# Patient Record
Sex: FEMALE | Race: WHITE | NOT HISPANIC OR LATINO | URBAN - METROPOLITAN AREA
[De-identification: names, ages, dates, MRNs, and addresses within clinical notes are randomized per-mention and may not be internally consistent; named-entity substitution may affect disease eponyms.]

---

## 2012-09-06 RX ORDER — LOSARTAN POTASSIUM 100 MG/1
1 TABLET, FILM COATED ORAL
Qty: 0 | Refills: 0 | COMMUNITY
Start: 2012-09-06

## 2017-01-18 ENCOUNTER — OUTPATIENT (OUTPATIENT)
Dept: OUTPATIENT SERVICES | Facility: HOSPITAL | Age: 82
LOS: 1 days | End: 2017-01-18
Payer: MEDICARE

## 2017-01-18 ENCOUNTER — APPOINTMENT (OUTPATIENT)
Dept: CT IMAGING | Facility: IMAGING CENTER | Age: 82
End: 2017-01-18

## 2017-01-18 DIAGNOSIS — Z00.8 ENCOUNTER FOR OTHER GENERAL EXAMINATION: ICD-10-CM

## 2017-01-18 DIAGNOSIS — S72.91XA UNSPECIFIED FRACTURE OF RIGHT FEMUR, INITIAL ENCOUNTER FOR CLOSED FRACTURE: Chronic | ICD-10-CM

## 2017-01-18 DIAGNOSIS — Z98.89 OTHER SPECIFIED POSTPROCEDURAL STATES: Chronic | ICD-10-CM

## 2017-01-18 PROCEDURE — 71250 CT THORAX DX C-: CPT

## 2017-10-05 ENCOUNTER — APPOINTMENT (OUTPATIENT)
Dept: OBGYN | Facility: CLINIC | Age: 82
End: 2017-10-05
Payer: MEDICARE

## 2017-10-05 PROCEDURE — 99213 OFFICE O/P EST LOW 20 MIN: CPT

## 2017-10-21 ENCOUNTER — OUTPATIENT (OUTPATIENT)
Dept: OUTPATIENT SERVICES | Facility: HOSPITAL | Age: 82
LOS: 1 days | End: 2017-10-21
Payer: MEDICARE

## 2017-10-21 ENCOUNTER — APPOINTMENT (OUTPATIENT)
Dept: ULTRASOUND IMAGING | Facility: IMAGING CENTER | Age: 82
End: 2017-10-21
Payer: MEDICARE

## 2017-10-21 DIAGNOSIS — Z00.8 ENCOUNTER FOR OTHER GENERAL EXAMINATION: ICD-10-CM

## 2017-10-21 DIAGNOSIS — Z98.89 OTHER SPECIFIED POSTPROCEDURAL STATES: Chronic | ICD-10-CM

## 2017-10-21 DIAGNOSIS — S72.91XA UNSPECIFIED FRACTURE OF RIGHT FEMUR, INITIAL ENCOUNTER FOR CLOSED FRACTURE: Chronic | ICD-10-CM

## 2017-10-21 PROCEDURE — 76856 US EXAM PELVIC COMPLETE: CPT

## 2017-10-21 PROCEDURE — 76856 US EXAM PELVIC COMPLETE: CPT | Mod: 26

## 2017-10-21 PROCEDURE — 76830 TRANSVAGINAL US NON-OB: CPT

## 2017-10-21 PROCEDURE — 76830 TRANSVAGINAL US NON-OB: CPT | Mod: 26

## 2017-12-07 ENCOUNTER — INPATIENT (INPATIENT)
Facility: HOSPITAL | Age: 82
LOS: 1 days | Discharge: HOME | DRG: 259 | End: 2017-12-09
Attending: INTERNAL MEDICINE | Admitting: INTERNAL MEDICINE
Payer: MEDICARE

## 2017-12-07 VITALS
OXYGEN SATURATION: 97 % | HEART RATE: 67 BPM | DIASTOLIC BLOOD PRESSURE: 83 MMHG | RESPIRATION RATE: 18 BRPM | TEMPERATURE: 98 F | SYSTOLIC BLOOD PRESSURE: 115 MMHG

## 2017-12-07 DIAGNOSIS — S72.91XA UNSPECIFIED FRACTURE OF RIGHT FEMUR, INITIAL ENCOUNTER FOR CLOSED FRACTURE: Chronic | ICD-10-CM

## 2017-12-07 DIAGNOSIS — R06.02 SHORTNESS OF BREATH: ICD-10-CM

## 2017-12-07 DIAGNOSIS — Z98.89 OTHER SPECIFIED POSTPROCEDURAL STATES: Chronic | ICD-10-CM

## 2017-12-07 LAB
ALBUMIN SERPL ELPH-MCNC: 3.5 G/DL — SIGNIFICANT CHANGE UP (ref 3.3–5)
ALP SERPL-CCNC: 137 U/L — HIGH (ref 40–120)
ALT FLD-CCNC: 14 U/L RC — SIGNIFICANT CHANGE UP (ref 10–45)
ANION GAP SERPL CALC-SCNC: 11 MMOL/L — SIGNIFICANT CHANGE UP (ref 5–17)
APPEARANCE UR: ABNORMAL
APTT BLD: 26.9 SEC — LOW (ref 27.5–37.4)
AST SERPL-CCNC: 27 U/L — SIGNIFICANT CHANGE UP (ref 10–40)
BACTERIA # UR AUTO: ABNORMAL /HPF
BASOPHILS # BLD AUTO: 0 K/UL — SIGNIFICANT CHANGE UP (ref 0–0.2)
BASOPHILS NFR BLD AUTO: 0.6 % — SIGNIFICANT CHANGE UP (ref 0–2)
BILIRUB SERPL-MCNC: 0.4 MG/DL — SIGNIFICANT CHANGE UP (ref 0.2–1.2)
BILIRUB UR-MCNC: NEGATIVE — SIGNIFICANT CHANGE UP
BUN SERPL-MCNC: 23 MG/DL — SIGNIFICANT CHANGE UP (ref 7–23)
CALCIUM SERPL-MCNC: 8.9 MG/DL — SIGNIFICANT CHANGE UP (ref 8.4–10.5)
CHLORIDE SERPL-SCNC: 101 MMOL/L — SIGNIFICANT CHANGE UP (ref 96–108)
CK MB BLD-MCNC: 1.3 % — SIGNIFICANT CHANGE UP (ref 0–3.5)
CK MB CFR SERPL CALC: 2 NG/ML — SIGNIFICANT CHANGE UP (ref 0–3.8)
CK SERPL-CCNC: 152 U/L — SIGNIFICANT CHANGE UP (ref 25–170)
CO2 SERPL-SCNC: 25 MMOL/L — SIGNIFICANT CHANGE UP (ref 22–31)
COLOR SPEC: YELLOW — SIGNIFICANT CHANGE UP
COMMENT - URINE: SIGNIFICANT CHANGE UP
COMMENT - URINE: SIGNIFICANT CHANGE UP
CREAT SERPL-MCNC: 0.79 MG/DL — SIGNIFICANT CHANGE UP (ref 0.5–1.3)
DIFF PNL FLD: NEGATIVE — SIGNIFICANT CHANGE UP
EOSINOPHIL # BLD AUTO: 0 K/UL — SIGNIFICANT CHANGE UP (ref 0–0.5)
EOSINOPHIL NFR BLD AUTO: 0.7 % — SIGNIFICANT CHANGE UP (ref 0–6)
EPI CELLS # UR: SIGNIFICANT CHANGE UP /HPF
GLUCOSE SERPL-MCNC: 93 MG/DL — SIGNIFICANT CHANGE UP (ref 70–99)
GLUCOSE UR QL: NEGATIVE — SIGNIFICANT CHANGE UP
HCT VFR BLD CALC: 34.9 % — SIGNIFICANT CHANGE UP (ref 34.5–45)
HGB BLD-MCNC: 11.7 G/DL — SIGNIFICANT CHANGE UP (ref 11.5–15.5)
INR BLD: 1.3 RATIO — HIGH (ref 0.88–1.16)
KETONES UR-MCNC: NEGATIVE — SIGNIFICANT CHANGE UP
LEUKOCYTE ESTERASE UR-ACNC: ABNORMAL
LYMPHOCYTES # BLD AUTO: 1.5 K/UL — SIGNIFICANT CHANGE UP (ref 1–3.3)
LYMPHOCYTES # BLD AUTO: 22.7 % — SIGNIFICANT CHANGE UP (ref 13–44)
MCHC RBC-ENTMCNC: 33.5 GM/DL — SIGNIFICANT CHANGE UP (ref 32–36)
MCHC RBC-ENTMCNC: 35.1 PG — HIGH (ref 27–34)
MCV RBC AUTO: 105 FL — HIGH (ref 80–100)
MONOCYTES # BLD AUTO: 0.6 K/UL — SIGNIFICANT CHANGE UP (ref 0–0.9)
MONOCYTES NFR BLD AUTO: 9.7 % — SIGNIFICANT CHANGE UP (ref 2–14)
NEUTROPHILS # BLD AUTO: 4.4 K/UL — SIGNIFICANT CHANGE UP (ref 1.8–7.4)
NEUTROPHILS NFR BLD AUTO: 66.2 % — SIGNIFICANT CHANGE UP (ref 43–77)
NITRITE UR-MCNC: POSITIVE
PH UR: 7.5 — SIGNIFICANT CHANGE UP (ref 5–8)
PLATELET # BLD AUTO: 246 K/UL — SIGNIFICANT CHANGE UP (ref 150–400)
POTASSIUM SERPL-MCNC: 4.4 MMOL/L — SIGNIFICANT CHANGE UP (ref 3.5–5.3)
POTASSIUM SERPL-SCNC: 4.4 MMOL/L — SIGNIFICANT CHANGE UP (ref 3.5–5.3)
PROT SERPL-MCNC: 7.3 G/DL — SIGNIFICANT CHANGE UP (ref 6–8.3)
PROT UR-MCNC: 30 MG/DL
PROTHROM AB SERPL-ACNC: 14.1 SEC — HIGH (ref 9.8–12.7)
RBC # BLD: 3.33 M/UL — LOW (ref 3.8–5.2)
RBC # FLD: 14.9 % — HIGH (ref 10.3–14.5)
RBC CASTS # UR COMP ASSIST: SIGNIFICANT CHANGE UP /HPF (ref 0–2)
SODIUM SERPL-SCNC: 137 MMOL/L — SIGNIFICANT CHANGE UP (ref 135–145)
SP GR SPEC: 1.02 — SIGNIFICANT CHANGE UP (ref 1.01–1.02)
TROPONIN T SERPL-MCNC: <0.01 NG/ML — SIGNIFICANT CHANGE UP (ref 0–0.06)
UROBILINOGEN FLD QL: NEGATIVE — SIGNIFICANT CHANGE UP
WBC # BLD: 6.6 K/UL — SIGNIFICANT CHANGE UP (ref 3.8–10.5)
WBC # FLD AUTO: 6.6 K/UL — SIGNIFICANT CHANGE UP (ref 3.8–10.5)
WBC UR QL: >50 /HPF (ref 0–5)

## 2017-12-07 PROCEDURE — 99285 EMERGENCY DEPT VISIT HI MDM: CPT | Mod: GC

## 2017-12-07 PROCEDURE — 99222 1ST HOSP IP/OBS MODERATE 55: CPT

## 2017-12-07 RX ORDER — LEVALBUTEROL 1.25 MG/.5ML
0.63 SOLUTION, CONCENTRATE RESPIRATORY (INHALATION) EVERY 4 HOURS
Qty: 0 | Refills: 0 | Status: DISCONTINUED | OUTPATIENT
Start: 2017-12-07 | End: 2017-12-09

## 2017-12-07 RX ORDER — DILTIAZEM HCL 120 MG
120 CAPSULE, EXT RELEASE 24 HR ORAL DAILY
Qty: 0 | Refills: 0 | Status: DISCONTINUED | OUTPATIENT
Start: 2017-12-07 | End: 2017-12-09

## 2017-12-07 RX ORDER — CEFTRIAXONE 500 MG/1
1 INJECTION, POWDER, FOR SOLUTION INTRAMUSCULAR; INTRAVENOUS ONCE
Qty: 0 | Refills: 0 | Status: COMPLETED | OUTPATIENT
Start: 2017-12-07 | End: 2017-12-07

## 2017-12-07 RX ORDER — TRAMADOL HYDROCHLORIDE 50 MG/1
50 TABLET ORAL
Qty: 0 | Refills: 0 | Status: DISCONTINUED | OUTPATIENT
Start: 2017-12-07 | End: 2017-12-09

## 2017-12-07 RX ORDER — CEFTRIAXONE 500 MG/1
1 INJECTION, POWDER, FOR SOLUTION INTRAMUSCULAR; INTRAVENOUS EVERY 24 HOURS
Qty: 0 | Refills: 0 | Status: DISCONTINUED | OUTPATIENT
Start: 2017-12-08 | End: 2017-12-09

## 2017-12-07 RX ORDER — ASPIRIN/CALCIUM CARB/MAGNESIUM 324 MG
81 TABLET ORAL DAILY
Qty: 0 | Refills: 0 | Status: DISCONTINUED | OUTPATIENT
Start: 2017-12-07 | End: 2017-12-09

## 2017-12-07 RX ORDER — SOTALOL HCL 120 MG
120 TABLET ORAL
Qty: 0 | Refills: 0 | Status: COMPLETED | OUTPATIENT
Start: 2017-12-07 | End: 2017-12-07

## 2017-12-07 RX ORDER — LOSARTAN POTASSIUM 100 MG/1
50 TABLET, FILM COATED ORAL DAILY
Qty: 0 | Refills: 0 | Status: DISCONTINUED | OUTPATIENT
Start: 2017-12-07 | End: 2017-12-09

## 2017-12-07 RX ORDER — SOTALOL HCL 120 MG
120 TABLET ORAL ONCE
Qty: 0 | Refills: 0 | Status: DISCONTINUED | OUTPATIENT
Start: 2017-12-07 | End: 2017-12-07

## 2017-12-07 RX ORDER — ALLOPURINOL 300 MG
300 TABLET ORAL ONCE
Qty: 0 | Refills: 0 | Status: COMPLETED | OUTPATIENT
Start: 2017-12-07 | End: 2017-12-08

## 2017-12-07 RX ORDER — PANTOPRAZOLE SODIUM 20 MG/1
40 TABLET, DELAYED RELEASE ORAL
Qty: 0 | Refills: 0 | Status: DISCONTINUED | OUTPATIENT
Start: 2017-12-07 | End: 2017-12-09

## 2017-12-07 RX ORDER — CEFTRIAXONE 500 MG/1
INJECTION, POWDER, FOR SOLUTION INTRAMUSCULAR; INTRAVENOUS
Qty: 0 | Refills: 0 | Status: DISCONTINUED | OUTPATIENT
Start: 2017-12-07 | End: 2017-12-09

## 2017-12-07 RX ADMIN — Medication 120 MILLIGRAM(S): at 22:07

## 2017-12-07 RX ADMIN — TRAMADOL HYDROCHLORIDE 50 MILLIGRAM(S): 50 TABLET ORAL at 22:07

## 2017-12-07 RX ADMIN — CEFTRIAXONE 100 GRAM(S): 500 INJECTION, POWDER, FOR SOLUTION INTRAMUSCULAR; INTRAVENOUS at 17:24

## 2017-12-07 NOTE — ED PROVIDER NOTE - OBJECTIVE STATEMENT
88 y/o female PMH COPD, CAD, Breast Ca, Thoraric/Abdominal Aortic Anuerysm s/p repair with multiple revions for endovascular leak, p/w SOB from her cardiologists office Dr Molina. Suspected that her pacemaker battery is denying and throwing her into AV dyssynchrony, resulting in her SOB. Dr. Molina signed pt out to Dr. Hernández for admission upon presentation to hospital.

## 2017-12-07 NOTE — ED ADULT NURSE NOTE - OBJECTIVE STATEMENT
88 y f came to the ed from her pcp with a dead pacemaker. states she had an ekg done to check if her heart was able to receive a certain abx and noticed her pacemaker battery is dead. was being placed on abx for a uti. daughter states the patients cardiologist told her a new battery would be placed tomorrow and to come directly to the ed. patient is confused to the year which daughter states is a new symptom about two weeks old but states it happens whenever the patient gets a uti. patient/daughter deny any fevers, chills, chest pain, sob. abdomen is soft and nontender. skin is warm and dry.

## 2017-12-07 NOTE — ED ADULT NURSE NOTE - PSH
Cardiac Pacemaker    medtronic  Femur fracture, right  s/p repair 1/2014  H/O dilation and curettage  5/2015  Hiatal Hernia  s/p fundoplication 1964  S/P Appendectomy  1947  S/P Colonoscopy  2005 normal  S/P Hernia Repair  right inguinal hernia repair 1956  S/P Lumpectomy of Breast  right s/p axillary lymph node dissection/ current lymphadema  S/P Tonsillectomy  childhood  Thoracic Aneurysm

## 2017-12-07 NOTE — ED PROVIDER NOTE - MEDICAL DECISION MAKING DETAILS
88 y/o female PMH COPD, CAD, Breast Ca, Thoraric/Abdominal Aortic Anuerysm s/p repair with multiple revions for endovascular leak, p/w SOB from her cardiologists office Dr Molina. Suspected that her pacemaker battery is denying and throwing her into AV dyssynchrony, resulting in her SOB. Dr. Molina signed pt out to Dr. Hernández for admission upon presentation to hospital. EKG in ED shows paced rhythm. Pt is stable, will admit -Darren

## 2017-12-07 NOTE — H&P ADULT - PMH
AAA (Abdominal Aortic Aneurysm)  current diagnosis  AF (Atrial Fibrillation)  diagnosed 2008  pt on sotalol and aspirin  Breast Ca  right s/p right lumpectomy axillary lymph node dissection 2000/ s/p chemo and radiation  current right lymphedema  Cardiac Pacemaker  medtronic placed 2010  Cellulitis  x2 2005  COPD (Chronic Obstructive Pulmonary Disease)    Gout  last attack 15 years ago  Hiatal Hernia  s/p fundoplication 1964  Hypertension    OA (Osteoarthritis)  b/l knee and right hip  s/p fall 8-1010  seen [facility]    Thoracic Aneurysm  pt had scan 8-2011  UTI (Lower Urinary Tract Infection)  chronic pt on antibiotics, no current symptoms

## 2017-12-07 NOTE — ED PROVIDER NOTE - ATTENDING CONTRIBUTION TO CARE
I performed a history and physical exam of the patient and discussed their management with the resident. I reviewed the resident's note and agree with the documented findings and plan of care.     Patient is an 88 yo F w/ hx of COPD, CAD, Breast Ca, Thoracic & Abdominal Aortic Aneurysm s/p repair sent in by Dr Molina for concern of failing pacemaker battery. Patient reports some sob with exertion that is thought be causing by dyssynchrony of pacemaker. No chest pain, fevers, chills, cough, nausea, vomiting.   VS noted  Gen. no acute distress, Non toxic   HEENT: EOMI, mmm,   Lungs: CTAB/L no C/ W /R   CVS: RRR   Abd; Soft non tender, non distended   Ext: no edema  Skin: no rash  Neuro AAOx3 non focal clear speech  a/p: sob, paced rhythm on EKG. Will get labs. ADMIT for further evaluation of PM, SOB.

## 2017-12-07 NOTE — ED PROVIDER NOTE - PMH
AAA (Abdominal Aortic Aneurysm)  current diagnosis  AF (Atrial Fibrillation)  diagnosed 2008  pt on sotalol and aspirin  Breast Ca  right s/p right lumpectomy axillary lymph node dissection 2000/ s/p chemo and radiation  current right lymphedema  Cardiac Pacemaker  medtronic placed 2010  Cellulitis  x2 2005  COPD (Chronic Obstructive Pulmonary Disease)    Gout  last attack 15 years ago  Hiatal Hernia  s/p fundoplication 1964  Hypertension    OA (Osteoarthritis)  b/l knee and right hip  s/p fall 8-1010  seen [facility]    Thoracic Aneurysm  pt had scan 8-2011  UTI (Lower Urinary Tract Infection)  chronic pt on antibiotics, no current symptoms

## 2017-12-07 NOTE — ED PROVIDER NOTE - CHIEF COMPLAINT
The patient is a 88y Female complaining of The patient is a 88y Female complaining of shortness of breath.

## 2017-12-07 NOTE — ED ADULT NURSE NOTE - PMH
AAA (Abdominal Aortic Aneurysm)  current diagnosis  AF (Atrial Fibrillation)  diagnosed 2008  pt on sotalol and aspirin  Breast Ca  right s/p right lumpectomy axillary lymph node dissection 2000/ s/p chemo and radiation  current right lymphedema  Cardiac Pacemaker  medtronic placed 2010  Cellulitis  x2 2005  COPD (Chronic Obstructive Pulmonary Disease)    Gout  last attack 15 years ago  Hiatal Hernia  s/p fundoplication 1964  Hypertension    OA (Osteoarthritis)  b/l knee and right hip  s/p fall 8-1010  seen [facility]    Thoracic Aneurysm  pt had scan 8-2011  UTI (Lower Urinary Tract Infection)  chronic pt on antibiotics, no current symptoms

## 2017-12-08 LAB
ANION GAP SERPL CALC-SCNC: 11 MMOL/L — SIGNIFICANT CHANGE UP (ref 5–17)
BUN SERPL-MCNC: 19 MG/DL — SIGNIFICANT CHANGE UP (ref 7–23)
CALCIUM SERPL-MCNC: 8.6 MG/DL — SIGNIFICANT CHANGE UP (ref 8.4–10.5)
CHLORIDE SERPL-SCNC: 100 MMOL/L — SIGNIFICANT CHANGE UP (ref 96–108)
CO2 SERPL-SCNC: 25 MMOL/L — SIGNIFICANT CHANGE UP (ref 22–31)
CREAT SERPL-MCNC: 0.85 MG/DL — SIGNIFICANT CHANGE UP (ref 0.5–1.3)
CULTURE RESULTS: SIGNIFICANT CHANGE UP
GLUCOSE SERPL-MCNC: 96 MG/DL — SIGNIFICANT CHANGE UP (ref 70–99)
HCT VFR BLD CALC: 37.2 % — SIGNIFICANT CHANGE UP (ref 34.5–45)
HGB BLD-MCNC: 12.3 G/DL — SIGNIFICANT CHANGE UP (ref 11.5–15.5)
INR BLD: 1.22 RATIO — HIGH (ref 0.88–1.16)
MCHC RBC-ENTMCNC: 33 GM/DL — SIGNIFICANT CHANGE UP (ref 32–36)
MCHC RBC-ENTMCNC: 34.4 PG — HIGH (ref 27–34)
MCV RBC AUTO: 104 FL — HIGH (ref 80–100)
PLATELET # BLD AUTO: 253 K/UL — SIGNIFICANT CHANGE UP (ref 150–400)
POTASSIUM SERPL-MCNC: 4.9 MMOL/L — SIGNIFICANT CHANGE UP (ref 3.5–5.3)
POTASSIUM SERPL-SCNC: 4.9 MMOL/L — SIGNIFICANT CHANGE UP (ref 3.5–5.3)
PROTHROM AB SERPL-ACNC: 13.4 SEC — HIGH (ref 9.8–12.7)
RBC # BLD: 3.57 M/UL — LOW (ref 3.8–5.2)
RBC # FLD: 14.8 % — HIGH (ref 10.3–14.5)
SODIUM SERPL-SCNC: 136 MMOL/L — SIGNIFICANT CHANGE UP (ref 135–145)
SPECIMEN SOURCE: SIGNIFICANT CHANGE UP
WBC # BLD: 6.8 K/UL — SIGNIFICANT CHANGE UP (ref 3.8–10.5)
WBC # FLD AUTO: 6.8 K/UL — SIGNIFICANT CHANGE UP (ref 3.8–10.5)

## 2017-12-08 PROCEDURE — 99232 SBSQ HOSP IP/OBS MODERATE 35: CPT

## 2017-12-08 RX ORDER — CEFAZOLIN SODIUM 1 G
2000 VIAL (EA) INJECTION EVERY 8 HOURS
Qty: 0 | Refills: 0 | Status: COMPLETED | OUTPATIENT
Start: 2017-12-08 | End: 2017-12-09

## 2017-12-08 RX ADMIN — Medication 100 MILLIGRAM(S): at 21:29

## 2017-12-08 RX ADMIN — CEFTRIAXONE 100 GRAM(S): 500 INJECTION, POWDER, FOR SOLUTION INTRAMUSCULAR; INTRAVENOUS at 15:21

## 2017-12-08 RX ADMIN — Medication 120 MILLIGRAM(S): at 06:10

## 2017-12-08 RX ADMIN — Medication 1 TABLET(S): at 06:10

## 2017-12-08 RX ADMIN — LOSARTAN POTASSIUM 50 MILLIGRAM(S): 100 TABLET, FILM COATED ORAL at 06:10

## 2017-12-08 RX ADMIN — Medication 300 MILLIGRAM(S): at 06:09

## 2017-12-08 NOTE — PROGRESS NOTE ADULT - ASSESSMENT
87 f with HTN, COPD, CAD, A-fib, sick sinus syndrome s/p PPM, PAD, abdominal and thoracic aortic aneurysm s/p EVAR, breast ca s/p lumpectomy and lymph node resection with RUE lymphedema, gout was sent from cardio for PPM at EOL. She had dysuria on and off since 2 weeks ago, was given some antibiotics with no improvement so was given another one which she finished, now she stated that does not have any dysuria, suprapubic or back pain, no fever or chills.  outside urine cx 11/30 with proteus and E-coli, R to amp, cefazolin bactrim  u/a here positive, urine cx sent    ? UTI   admitted for PPM gen change  c/w ceftriaxone 1 g  q 24  will f/u the cultures

## 2017-12-08 NOTE — CONSULT NOTE ADULT - ASSESSMENT
87 f with HTN, COPD, CAD, A-fib, sick sinus syndrome s/p PPM, PAD, abdominal and thoracic aortic aneurysm s/p EVAR, breast ca s/p lumpectomy and lymph node resection with RUE lymphedema, gout was sent from cardio for PPM at EOL. She had dysuria on and off since 2 weeks ago, was given some antibiotics with no improvement so was given another one which she finished, now she stated that does not have any dysuria, suprapubic or back pain, no fever or chills.  outside urine cx 11/30 with proteus and E-coli, R to amp, cefazolin bactrim    ? UTI   PPM change tomorrow  straight cath for U/A and culture  ceftriaxone 1 g stat and q 24  will f/u the cultures
87 y/o female with multiple medical problems with recent UTI and PPM malfunction. Pt will have PPM revised today.  ID has seen patient and started antibiotics. Await repeat cultures prior to deciding on antibiotic therapy and length of therapy.

## 2017-12-09 ENCOUNTER — TRANSCRIPTION ENCOUNTER (OUTPATIENT)
Age: 82
End: 2017-12-09

## 2017-12-09 VITALS
RESPIRATION RATE: 18 BRPM | SYSTOLIC BLOOD PRESSURE: 154 MMHG | DIASTOLIC BLOOD PRESSURE: 93 MMHG | HEART RATE: 69 BPM | TEMPERATURE: 98 F | OXYGEN SATURATION: 96 %

## 2017-12-09 LAB
HCT VFR BLD CALC: 36.5 % — SIGNIFICANT CHANGE UP (ref 34.5–45)
HGB BLD-MCNC: 11.7 G/DL — SIGNIFICANT CHANGE UP (ref 11.5–15.5)
MCHC RBC-ENTMCNC: 32.1 GM/DL — SIGNIFICANT CHANGE UP (ref 32–36)
MCHC RBC-ENTMCNC: 32.5 PG — SIGNIFICANT CHANGE UP (ref 27–34)
MCV RBC AUTO: 101.4 FL — HIGH (ref 80–100)
PLATELET # BLD AUTO: 271 K/UL — SIGNIFICANT CHANGE UP (ref 150–400)
RBC # BLD: 3.6 M/UL — LOW (ref 3.8–5.2)
RBC # FLD: 15.8 % — HIGH (ref 10.3–14.5)
WBC # BLD: 6.03 K/UL — SIGNIFICANT CHANGE UP (ref 3.8–10.5)
WBC # FLD AUTO: 6.03 K/UL — SIGNIFICANT CHANGE UP (ref 3.8–10.5)

## 2017-12-09 PROCEDURE — 99232 SBSQ HOSP IP/OBS MODERATE 35: CPT | Mod: GC

## 2017-12-09 RX ORDER — SOTALOL HCL 120 MG
1 TABLET ORAL
Qty: 60 | Refills: 0 | OUTPATIENT
Start: 2017-12-09 | End: 2018-01-08

## 2017-12-09 RX ORDER — ERTAPENEM SODIUM 1 G/1
1000 INJECTION, POWDER, LYOPHILIZED, FOR SOLUTION INTRAMUSCULAR; INTRAVENOUS EVERY 24 HOURS
Qty: 0 | Refills: 0 | Status: DISCONTINUED | OUTPATIENT
Start: 2017-12-10 | End: 2017-12-09

## 2017-12-09 RX ORDER — ERTAPENEM SODIUM 1 G/1
1000 INJECTION, POWDER, LYOPHILIZED, FOR SOLUTION INTRAMUSCULAR; INTRAVENOUS ONCE
Qty: 0 | Refills: 0 | Status: COMPLETED | OUTPATIENT
Start: 2017-12-09 | End: 2017-12-09

## 2017-12-09 RX ORDER — PANTOPRAZOLE SODIUM 20 MG/1
1 TABLET, DELAYED RELEASE ORAL
Qty: 0 | Refills: 0 | COMMUNITY
Start: 2017-12-09

## 2017-12-09 RX ORDER — ERTAPENEM SODIUM 1 G/1
INJECTION, POWDER, LYOPHILIZED, FOR SOLUTION INTRAMUSCULAR; INTRAVENOUS
Qty: 0 | Refills: 0 | Status: DISCONTINUED | OUTPATIENT
Start: 2017-12-09 | End: 2017-12-09

## 2017-12-09 RX ORDER — FOSFOMYCIN TROMETHAMINE 3 G/1
1 POWDER ORAL
Qty: 1 | Refills: 0 | OUTPATIENT
Start: 2017-12-09 | End: 2017-12-10

## 2017-12-09 RX ORDER — ZOLPIDEM TARTRATE 10 MG/1
1 TABLET ORAL
Qty: 0 | Refills: 0 | COMMUNITY

## 2017-12-09 RX ADMIN — ERTAPENEM SODIUM 120 MILLIGRAM(S): 1 INJECTION, POWDER, LYOPHILIZED, FOR SOLUTION INTRAMUSCULAR; INTRAVENOUS at 10:42

## 2017-12-09 RX ADMIN — Medication 81 MILLIGRAM(S): at 10:42

## 2017-12-09 RX ADMIN — LOSARTAN POTASSIUM 50 MILLIGRAM(S): 100 TABLET, FILM COATED ORAL at 05:18

## 2017-12-09 RX ADMIN — Medication 120 MILLIGRAM(S): at 05:18

## 2017-12-09 RX ADMIN — Medication 100 MILLIGRAM(S): at 05:27

## 2017-12-09 RX ADMIN — PANTOPRAZOLE SODIUM 40 MILLIGRAM(S): 20 TABLET, DELAYED RELEASE ORAL at 05:28

## 2017-12-09 NOTE — PROGRESS NOTE ADULT - ASSESSMENT
87 f with HTN, COPD, CAD, A-fib, sick sinus syndrome s/p PPM, PAD, abdominal and thoracic aortic aneurysm s/p EVAR, breast ca s/p lumpectomy and lymph node resection with RUE lymphedema, gout was sent from cardio for PPM at EOL. She had dysuria on and off since 2 weeks ago, was given some antibiotics with no improvement so was given another one which she finished, now she stated that does not have any dysuria, suprapubic or back pain, no fever or chills.  outside urine cx 11/30 with proteus and E-coli, R to amp, cefazolin bactrim  u/a here positive, urine cx sent    UTI, did not improve on ceftriaxone and the urine cx was contaminated  straight cath for urine cx  DC ceftriaxone  start Ertapenem 1 g q 24  s/p PPM gen change

## 2017-12-09 NOTE — DISCHARGE NOTE ADULT - HOSPITAL COURSE
87 f with HTN, COPD, CAD, A-fib, sick sinus syndrome s/p PPM, PAD, abdominal and thoracic aortic aneurysm s/p EVAR, breast ca s/p lumpectomy and lymph node resection with RUE lymphedema, seen by cardio for PPM generator changed. Patient tolerated procedure well       Positive leukocyte and nitrite in UA but, urine culture was contaminated. s/p  IV abx, d/c on PO abx per ID phosphomycin 3gm pox1 dose    Patient stable to d/c home with home health aid and will f/u w/ pcp/cardiologist. Possible ID f/u for UTI if still sypmtomatic.

## 2017-12-09 NOTE — DISCHARGE NOTE ADULT - MEDICATION SUMMARY - MEDICATIONS TO TAKE
I will START or STAY ON the medications listed below when I get home from the hospital:    Aspirin Enteric Coated 81 mg oral delayed release tablet  -- 2 tab(s) by mouth once a day  -- Indication: For Afib    traMADol 50 mg oral tablet  -- 1 tab(s) by mouth 2 times a day  -- Indication: For pain management     losartan 50 mg oral tablet  -- 1 tab(s) by mouth once a day  -- Indication: For Essential hypertension    DilTIAZem Hydrochloride  mg/24 hours oral capsule, extended release  -- 1 cap(s) by mouth once a day  -- Indication: For afib    Fosamax 70 mg oral tablet  -- 1 tab(s) by mouth once a week on Saturday  -- Indication: For arthritis    levalbuterol 0.63 mg/3 mL inhalation solution  -- 3 milliliter(s) inhaled every 4 hours, As Needed -SOB or wheezing  -- Indication: For COPD (chronic obstructive pulmonary disease)    docusate sodium 100 mg oral capsule  -- 1 cap(s) by mouth 2 times a day, As Needed  -- Indication: For Stool softner    fosfomycin 3 g oral powder for reconstitution  -- 1 each by mouth once   -- Dilute this medication with liquid before administration.  It is very important that you take or use this exactly as directed.  Do not skip doses or discontinue unless directed by your doctor.    -- Indication: For UTI (urinary tract infection)    omeprazole 40 mg oral delayed release capsule  -- 1 cap(s) by mouth once a day  -- Indication: For GI prophylaxis    Centrum oral tablet  -- 1 tab(s) by mouth once a day  -- Indication: For Supplement    Caltrate 600 + D oral tablet  -- 1 tab(s) by mouth once a day  -- Indication: For Supplement

## 2017-12-09 NOTE — DISCHARGE NOTE ADULT - CARE PLAN
Principal Discharge DX:	Cardiac pacemaker battery worn out  Goal:	f/u w/ cardiologist s/p d/c  Instructions for follow-up, activity and diet:	Changed of PPM generator battery. prophylaxis abx given  your pacemaker can send an electrical signal to your heart when it is necessary  call your doctor if you feel dizzy, lightheaded, shortness of breath, confused, more tired, faint  you will follow up with your pacemaker doctor regularly to check your pacemaker  your pacemaker battery usually will last 5 - 8 years  avoid certain electric or magnetic equipment  if you cannot walk through metal detector, ask for hand security search  most of the time you will not be able to have MRI  follow directions  that you received about arm use and mobility, driving, sex & sling use  you make want to get a emergency medical bracelet telling people you have a pacemaker  tell any health care provider that you have a pacemaker  Secondary Diagnosis:	COPD (chronic obstructive pulmonary disease)  Goal:	continue w/ current medication  Instructions for follow-up, activity and diet:	Call your Health Care provider upon arrival home to make a follow up appointment within one week.  Take all inhalers as prescribed by your Health Care Provider.  Take steroids as prescribed by your Health Care Provider.  If your cough increases infrequency and severity and/or you have shortness of breath or increased shortness of breath call your Health Care Provider.  If you develop fever, chills, night sweats, malaise, and/or change in mental status call your Health care Provider.  Nutrition is very important.  Eat small frequent meals.  Increase your activity as tolerated.  Do not stay in bed all day  Secondary Diagnosis:	Essential hypertension  Goal:	cont w/ current med  Instructions for follow-up, activity and diet:	You have discomfort in your chest that feels like squeezing, pressure, fullness, or pain.  You become confused or have difficulty speaking.  You suddenly feel lightheaded or have trouble breathing.  You have pain or discomfort in your back, neck, jaw, stomach, or arm.    Seek care immediately if:  You have a severe headache or vision loss.  •You have weakness in an arm or leg.  Secondary Diagnosis:	UTI (urinary tract infection)  Goal:	to drink plenty of water and to continue w/ abx fosfomycin 3g x 1 dose  Instructions for follow-up, activity and diet:	HOME CARE INSTRUCTIONS   you were prescribed antibiotics, take them exactly as your caregiver instructs you. Finish the medication even if you feel better after you have only taken some of the medication.  Drink enough water and fluids to keep your urine clear or pale yellow.  Avoid caffeine, tea, and carbonated beverages. They tend to irritate your bladder.  Empty your bladder often. Avoid holding urine for long periods of time.  Empty your bladder before and after sexual intercourse.  After a bowel movement, women should cleanse from front to back. Use each tissue only once.  SEEK MEDICAL CARE IF:  You have back pain.  You develop a fever.  Your symptoms do not begin to resolve within 3 days.  SEEK IMMEDIATE MEDICAL CARE IF:  You have severe back pain or lower abdominal pain.  You develop chills.  You have nausea or vomiting.  You have continued burning or discomfort with urination.

## 2017-12-09 NOTE — PROGRESS NOTE ADULT - SUBJECTIVE AND OBJECTIVE BOX
- Patient seen and examined.  - In summary, patient is a 88y year old woman who presented with SOB (07 Dec 2017 14:14)  - Today, patient is without complaints.         *****MEDICATIONS:    MEDICATIONS  (STANDING):  aspirin enteric coated 81 milliGRAM(s) Oral daily  cefTRIAXone   IVPB      cefTRIAXone   IVPB 1 Gram(s) IV Intermittent every 24 hours  diltiazem    milliGRAM(s) Oral daily  losartan 50 milliGRAM(s) Oral daily  pantoprazole    Tablet 40 milliGRAM(s) Oral before breakfast    MEDICATIONS  (PRN):  levalbuterol for Nebulization - Peds 0.63 milliGRAM(s) Nebulizer every 4 hours PRN dyspnea  traMADol 50 milliGRAM(s) Oral two times a day PRN Moderate Pain (4 - 6)           ***** REVIEW OF SYSTEM:  GEN: no fever, no chills, no pain  RESP: no SOB, no cough, no sputum  CVS: no chest pain, no palpitations, no edema  GI: no abdominal pain, no nausea, no vomiting, no constipation, no diarrhea  : no dysurea, no frequency  NEURO: no headache, no diziness  PSYCH: no depression, not anxious  Derm : no itching, no rash         ***** VITAL SIGNS:  T(F): 98.2 (17 @ 04:20), Max: 98.2 (17 @ 04:20)  HR: 65 (17 @ 04:20) (65 - 70)  BP: 143/95 (17 @ 04:20) (115/83 - 150/93)  RR: 18 (17 @ 04:20) (17 - 18)  SpO2: 96% (17 @ 04:20) (95% - 97%)  Wt(kg): --  ,   I&O's Summary    07 Dec 2017 07:01  -  08 Dec 2017 07:00  --------------------------------------------------------  IN: 120 mL / OUT: 0 mL / NET: 120 mL             *****PHYSICAL EXAM:  GEN: A&O X 3 , NAD , comfortable  HEENT: NCAT, EOMI, MMM, no icterus  NECK: Supple, No JVD  CVS: S1S2 , regular , No M/R/G appreciated  PULM: CTA B/L,  no W/R/R appreciated  ABD.: soft. non tender, non distended,  bowel sounds present  Extrem: intact pulses , no edema noted  Derm: No rash or ecchymosis noted  PSYCH: normal mood, no depression, not anxious         *****LAB AND IMAGIN.7   6.6   )-----------( 246      ( 07 Dec 2017 15:43 )             34.9               12    137  |  101  |  23  ----------------------------<  93  4.4   |  25  |  0.79    Ca    8.9      07 Dec 2017 15:43    TPro  7.3  /  Alb  3.5  /  TBili  0.4  /  DBili  x   /  AST  27  /  ALT  14  /  AlkPhos  137<H>  12-07    PT/INR - ( 07 Dec 2017 15:43 )   PT: 14.1 sec;   INR: 1.30 ratio         PTT - ( 07 Dec 2017 15:43 )  PTT:26.9 sec       CARDIAC MARKERS ( 07 Dec 2017 15:43 )  x     / <0.01 ng/mL / 152 U/L / x     / 2.0 ng/mL              Urinalysis Basic - ( 07 Dec 2017 21:43 )    Color: Yellow / Appearance: Turbid / S.020 / pH: x  Gluc: x / Ketone: Negative  / Bili: Negative / Urobili: Negative   Blood: x / Protein: 30 mg/dL / Nitrite: Positive   Leuk Esterase: Large / RBC: 3-5 /HPF / WBC >50 /HPF   Sq Epi: x / Non Sq Epi: Few /HPF / Bacteria: Moderate /HPF      [All pertinent recent Imaging/Reports reviewed]         *****A S S E S S M E N T   A N D   P L A N :  88F PAF/SSS, PPM  dyspnea with PPM at EOL, VVI mode  suspect SOB from AV dyssynchrony  EP eval by Dr oMlina  needs gen change  cont home meds  NPO  cont tele  abx per ID for UTI      __________________________  SO Hernández D.O.
CARDIOLOGY     PROGRESS  NOTE   ________________________________________________    CHIEF COMPLAINT:Patient is a 88y old  Female who presents with a chief complaint of SOB (09 Dec 2017 09:37)  s/p gen change.  	  REVIEW OF SYSTEMS:  CONSTITUTIONAL: No fever, weight loss, or fatigue  EYES: No eye pain, visual disturbances, or discharge  ENT:  No difficulty hearing, tinnitus, vertigo; No sinus or throat pain  NECK: No pain or stiffness  RESPIRATORY: No cough, wheezing, chills or hemoptysis; No Shortness of Breath  CARDIOVASCULAR: No chest pain, palpitations, passing out, dizziness, or leg swelling  GASTROINTESTINAL: No abdominal or epigastric pain. No nausea, vomiting, or hematemesis; No diarrhea or constipation. No melena or hematochezia.  GENITOURINARY: No dysuria, frequency, hematuria, or incontinence  NEUROLOGICAL: No headaches, memory loss, loss of strength, numbness, or tremors  SKIN: No itching, burning, rashes, or lesions   LYMPH Nodes: No enlarged glands  ENDOCRINE: No heat or cold intolerance; No hair loss  MUSCULOSKELETAL: No joint pain or swelling; No muscle, back, or extremity pain  PSYCHIATRIC: No depression, anxiety, mood swings, or difficulty sleeping  HEME/LYMPH: No easy bruising, or bleeding gums  ALLERGY AND IMMUNOLOGIC: No hives or eczema	    [ ] All others negative	  [ ] Unable to obtain    PHYSICAL EXAM:  T(C): 36.4 (12-09-17 @ 11:53), Max: 36.6 (12-08-17 @ 13:35)  HR: 69 (12-09-17 @ 11:53) (59 - 95)  BP: 154/93 (12-09-17 @ 11:53) (130/85 - 155/97)  RR: 18 (12-09-17 @ 11:53) (17 - 18)  SpO2: 96% (12-09-17 @ 11:53) (96% - 98%)  Wt(kg): --  I&O's Summary    08 Dec 2017 07:01  -  09 Dec 2017 07:00  --------------------------------------------------------  IN: 120 mL / OUT: 0 mL / NET: 120 mL    09 Dec 2017 07:01  -  09 Dec 2017 12:03  --------------------------------------------------------  IN: 240 mL / OUT: 0 mL / NET: 240 mL        Appearance: Normal	  HEENT:   Normal oral mucosa, PERRL, EOMI	  Lymphatic: No lymphadenopathy  Cardiovascular: Normal S1 S2, No JVD, No murmurs, No edema  Respiratory: Lungs clear to auscultation	  Psychiatry: A & O x 3, Mood & affect appropriate  Gastrointestinal:  Soft, Non-tender, + BS	  Skin: No rashes, No ecchymoses, No cyanosis	  Neurologic: Non-focal  Extremities: Normal range of motion, No clubbing, cyanosis or edema  Vascular: Peripheral pulses palpable 2+ bilaterally  ppm site clean and dtry  MEDICATIONS  (STANDING):  aspirin enteric coated 81 milliGRAM(s) Oral daily  diltiazem    milliGRAM(s) Oral daily  ertapenem  IVPB      losartan 50 milliGRAM(s) Oral daily  pantoprazole    Tablet 40 milliGRAM(s) Oral before breakfast      TELEMETRY: 	    ECG:  	  RADIOLOGY:  OTHER: 	  	  LABS:	 	    CARDIAC MARKERS:  CARDIAC MARKERS ( 07 Dec 2017 15:43 )  x     / <0.01 ng/mL / 152 U/L / x     / 2.0 ng/mL                                11.7   6.03  )-----------( 271      ( 09 Dec 2017 08:13 )             36.5     12-08    136  |  100  |  19  ----------------------------<  96  4.9   |  25  |  0.85    Ca    8.6      08 Dec 2017 08:59    TPro  7.3  /  Alb  3.5  /  TBili  0.4  /  DBili  x   /  AST  27  /  ALT  14  /  AlkPhos  137<H>  12-07    proBNP:   Lipid Profile:   HgA1c:   TSH:   PT/INR - ( 08 Dec 2017 08:59 )   PT: 13.4 sec;   INR: 1.22 ratio         PTT - ( 07 Dec 2017 15:43 )  PTT:26.9 sec      Assessment and plan  ---------------------------  s/p ppm change  dc as clear by ID
EP NP    S/P PPM Generator Change 12/8  PPM Booklet/ ID Card provided  Post procedure instructions provided to patient    [place] [id number]
Follow Up:  UTI    Interval History: patient afebrile, started on ceftriaxone awaiting culture results    ROS:    :  All other systems negative    Constitutional: no fever, no chills  HEENT:  no vision changes, no sore throat, no rhinorrhea  Cardiovascular:  no chest pain, no palpitation  Respiratory:  no SOB, no cough  GI:  no abd pain, no vomiting, no diarrhea  urinary: on and off dysuria, no hematuria, no flank pain  musculoskeletal:  no joint pain, no joint swelling  skin:  no rash  neurology:  no headache, no seizure, no change in mental status      Allergies  No Known Drug Allergies  shellfish (Rash)        ANTIMICROBIALS:  cefTRIAXone   IVPB    cefTRIAXone   IVPB 1 every 24 hours      OTHER MEDS:  aspirin enteric coated 81 milliGRAM(s) Oral daily  diltiazem    milliGRAM(s) Oral daily  levalbuterol for Nebulization - Peds 0.63 milliGRAM(s) Nebulizer every 4 hours PRN  losartan 50 milliGRAM(s) Oral daily  pantoprazole    Tablet 40 milliGRAM(s) Oral before breakfast  traMADol 50 milliGRAM(s) Oral two times a day PRN      Vital Signs Last 24 Hrs  T(C): 36.8 (08 Dec 2017 04:20), Max: 36.8 (08 Dec 2017 04:20)  T(F): 98.2 (08 Dec 2017 04:20), Max: 98.2 (08 Dec 2017 04:20)  HR: 65 (08 Dec 2017 04:20) (65 - 70)  BP: 143/95 (08 Dec 2017 04:20) (122/83 - 150/93)  BP(mean): --  RR: 18 (08 Dec 2017 04:20) (18 - 18)  SpO2: 96% (08 Dec 2017 04:20) (95% - 96%)    Physical Exam:  General:    NAD,  non toxic, A&O x 3  HEENT:    no oropharyngeal lesions,   no LAD,   neck supple  Cardiovascular:     regular S1, S2,  no murmur, left chest AICD  Respiratory:    clear b/l,    no wheezing  abd:     soft,   BS +,   no tenderness,    no organomegaly  :   no CVAT,  no suprapubic tenderness,   no  valladares  Musculoskeletal:   no joint swelling,   no edema  Skin:    no rash  Neurologic:     no focal deficit                          12.3   6.8   )-----------( 253      ( 08 Dec 2017 08:59 )             37.2       12    136  |  100  |  19  ----------------------------<  96  4.9   |  25  |  0.85    Ca    8.6      08 Dec 2017 08:59    TPro  7.3  /  Alb  3.5  /  TBili  0.4  /  DBili  x   /  AST  27  /  ALT  14  /  AlkPhos  137<H>  12-07      Urinalysis Basic - ( 07 Dec 2017 21:43 )    Color: Yellow / Appearance: Turbid / S.020 / pH: x  Gluc: x / Ketone: Negative  / Bili: Negative / Urobili: Negative   Blood: x / Protein: 30 mg/dL / Nitrite: Positive   Leuk Esterase: Large / RBC: 3-5 /HPF / WBC >50 /HPF   Sq Epi: x / Non Sq Epi: Few /HPF / Bacteria: Moderate /HPF        MICROBIOLOGY:  v            RADIOLOGY:  < from: US Transvaginal (10.21.17 @ 13:19) >    Thickened endometrium with cystic changes. Consider sampling.
Follow Up:  UTI    Interval History: pt is afebrile s/p PPM gen change but still has dysuria on ceftriaxone, her urine cx came back with> 3 organisms and contaminaged    ROS:      All other systems negative    Constitutional: no fever, no chills  HEENT:  no vision changes, no sore throat, no rhinorrhea  Cardiovascular:  no chest pain, no palpitation  Respiratory:  no SOB, no cough  GI:  no abd pain, no vomiting, no diarrhea  urinary: + dysuria, no hematuria, no flank pain  musculoskeletal:  no joint pain, no joint swelling  skin:  no rash  neurology:  no headache, no seizure, no change in mental status      Allergies  No Known Drug Allergies  shellfish (Rash)        ANTIMICROBIALS:  ertapenem  IVPB        OTHER MEDS:  aspirin enteric coated 81 milliGRAM(s) Oral daily  diltiazem    milliGRAM(s) Oral daily  levalbuterol for Nebulization - Peds 0.63 milliGRAM(s) Nebulizer every 4 hours PRN  losartan 50 milliGRAM(s) Oral daily  pantoprazole    Tablet 40 milliGRAM(s) Oral before breakfast  traMADol 50 milliGRAM(s) Oral two times a day PRN      Vital Signs Last 24 Hrs  T(C): 36.6 (09 Dec 2017 04:41), Max: 36.6 (08 Dec 2017 13:35)  T(F): 97.8 (09 Dec 2017 04:41), Max: 97.8 (08 Dec 2017 13:35)  HR: 95 (09 Dec 2017 04:41) (59 - 95)  BP: 142/92 (09 Dec 2017 04:41) (130/85 - 155/97)  BP(mean): --  RR: 18 (09 Dec 2017 04:41) (17 - 18)  SpO2: 96% (09 Dec 2017 04:41) (96% - 98%)    Physical Exam:  General:    NAD,  non toxic, A&O x 3  HEENT:    no oropharyngeal lesions,   no LAD,   neck supple  Cardiovascular:     regular S1, S2,  no murmur, L chest PPM s/p gen change, clean, no drainage  Respiratory:    clear b/l,    no wheezing  abd:     soft,   BS +,   no tenderness,    no organomegaly  :   no CVAT,  no suprapubic tenderness,   no  valladares  Musculoskeletal:   no joint swelling,   no edema  Skin:    no rash  Neurologic:     no focal deficit                          11.7   6.03  )-----------( 271      ( 09 Dec 2017 08:13 )             36.5           136  |  100  |  19  ----------------------------<  96  4.9   |  25  |  0.85    Ca    8.6      08 Dec 2017 08:59    TPro  7.3  /  Alb  3.5  /  TBili  0.4  /  DBili  x   /  AST  27  /  ALT  14  /  AlkPhos  137<H>  -      Urinalysis Basic - ( 07 Dec 2017 21:43 )    Color: Yellow / Appearance: Turbid / S.020 / pH: x  Gluc: x / Ketone: Negative  / Bili: Negative / Urobili: Negative   Blood: x / Protein: 30 mg/dL / Nitrite: Positive   Leuk Esterase: Large / RBC: 3-5 /HPF / WBC >50 /HPF   Sq Epi: x / Non Sq Epi: Few /HPF / Bacteria: Moderate /HPF        MICROBIOLOGY:  v  .Urine Catheterized  17   Culture grew 3 or more types of organisms which indicate  collection contamination; consider recollection only if clinically  indicated.  --  --                RADIOLOGY:  < from: US Transvaginal (10.21.17 @ 13:19) >    Thickened endometrium with cystic changes. Consider sampling.

## 2017-12-09 NOTE — DISCHARGE NOTE ADULT - CARE PROVIDER_API CALL
Kevin Molina (DO), Cardiology Medicine  [address] [place] [id number]  Phone: [phone]  Fax: [phone]

## 2017-12-09 NOTE — DISCHARGE NOTE ADULT - MEDICATION SUMMARY - MEDICATIONS TO CHANGE
I will SWITCH the dose or number of times a day I take the medications listed below when I get home from the hospital:    losartan 100 mg oral tablet  -- 1 tab(s) by mouth once a day    Aspirin Enteric Coated 81 mg oral delayed release tablet  -- 2 tab(s) by mouth once a day    Xopenex HFA 45 mcg/inh inhalation aerosol  -- 2 puff(s) inhaled every 4 hours, As Needed I will SWITCH the dose or number of times a day I take the medications listed below when I get home from the hospital:    losartan 100 mg oral tablet  -- 1 tab(s) by mouth once a day    Xopenex HFA 45 mcg/inh inhalation aerosol  -- 2 puff(s) inhaled every 4 hours, As Needed

## 2017-12-09 NOTE — DISCHARGE NOTE ADULT - PLAN OF CARE
cont w/ current med You have discomfort in your chest that feels like squeezing, pressure, fullness, or pain.  You become confused or have difficulty speaking.  You suddenly feel lightheaded or have trouble breathing.  You have pain or discomfort in your back, neck, jaw, stomach, or arm.    Seek care immediately if:  You have a severe headache or vision loss.  •You have weakness in an arm or leg. to drink plenty of water and to continue w/ abx fosfomycin 3g x 1 dose HOME CARE INSTRUCTIONS   you were prescribed antibiotics, take them exactly as your caregiver instructs you. Finish the medication even if you feel better after you have only taken some of the medication.  Drink enough water and fluids to keep your urine clear or pale yellow.  Avoid caffeine, tea, and carbonated beverages. They tend to irritate your bladder.  Empty your bladder often. Avoid holding urine for long periods of time.  Empty your bladder before and after sexual intercourse.  After a bowel movement, women should cleanse from front to back. Use each tissue only once.  SEEK MEDICAL CARE IF:  You have back pain.  You develop a fever.  Your symptoms do not begin to resolve within 3 days.  SEEK IMMEDIATE MEDICAL CARE IF:  You have severe back pain or lower abdominal pain.  You develop chills.  You have nausea or vomiting.  You have continued burning or discomfort with urination. f/u w/ cardiologist s/p d/c Changed of PPM generator battery. prophylaxis abx given  your pacemaker can send an electrical signal to your heart when it is necessary  call your doctor if you feel dizzy, lightheaded, shortness of breath, confused, more tired, faint  you will follow up with your pacemaker doctor regularly to check your pacemaker  your pacemaker battery usually will last 5 - 8 years  avoid certain electric or magnetic equipment  if you cannot walk through metal detector, ask for hand security search  most of the time you will not be able to have MRI  follow directions  that you received about arm use and mobility, driving, sex & sling use  you make want to get a emergency medical bracelet telling people you have a pacemaker  tell any health care provider that you have a pacemaker continue w/ current medication Call your Health Care provider upon arrival home to make a follow up appointment within one week.  Take all inhalers as prescribed by your Health Care Provider.  Take steroids as prescribed by your Health Care Provider.  If your cough increases infrequency and severity and/or you have shortness of breath or increased shortness of breath call your Health Care Provider.  If you develop fever, chills, night sweats, malaise, and/or change in mental status call your Health care Provider.  Nutrition is very important.  Eat small frequent meals.  Increase your activity as tolerated.  Do not stay in bed all day

## 2017-12-09 NOTE — DISCHARGE NOTE ADULT - MEDICATION SUMMARY - MEDICATIONS TO STOP TAKING
I will STOP taking the medications listed below when I get home from the hospital:    allopurinol 300 mg oral tablet  -- 1 tab(s) by mouth once a day    sotalol 120 mg oral tablet  -- 1 tab(s) by mouth 2 times a day    Nexium 40 mg oral delayed release capsule  -- 1 cap(s) by mouth once a day    Keflex 500 mg oral capsule  -- 1 cap(s) by mouth 4 times a day  -- Finish all this medication unless otherwise directed by prescriber.    azithromycin 250 mg oral tablet  -- 1 tab(s) by mouth once a day  -- Do not take dairy products, antacids, or iron preparations within one hour of this medication.  Finish all this medication unless otherwise directed by prescriber.    predniSONE 20 mg oral tablet  -- 2 tab(s) by mouth once a day  -- It is very important that you take or use this exactly as directed.  Do not skip doses or discontinue unless directed by your doctor.  Obtain medical advice before taking any non-prescription drugs as some may affect the action of this medication.  Take with food or milk.    amoxicillin-clavulanate 250 mg-125 mg oral tablet  -- 1 tab(s) by mouth every 8 hours  -- Finish all this medication unless otherwise directed by prescriber.  Take with food or milk.    zolpidem 5 mg oral tablet  -- 1 tab(s) by mouth once a day (at bedtime)    pantoprazole 40 mg oral delayed release tablet  -- 1 tab(s) by mouth once a day (before a meal) I will STOP taking the medications listed below when I get home from the hospital:    allopurinol 300 mg oral tablet  -- 1 tab(s) by mouth once a day    Nexium 40 mg oral delayed release capsule  -- 1 cap(s) by mouth once a day    Keflex 500 mg oral capsule  -- 1 cap(s) by mouth 4 times a day  -- Finish all this medication unless otherwise directed by prescriber.    azithromycin 250 mg oral tablet  -- 1 tab(s) by mouth once a day  -- Do not take dairy products, antacids, or iron preparations within one hour of this medication.  Finish all this medication unless otherwise directed by prescriber.    predniSONE 20 mg oral tablet  -- 2 tab(s) by mouth once a day  -- It is very important that you take or use this exactly as directed.  Do not skip doses or discontinue unless directed by your doctor.  Obtain medical advice before taking any non-prescription drugs as some may affect the action of this medication.  Take with food or milk.    amoxicillin-clavulanate 250 mg-125 mg oral tablet  -- 1 tab(s) by mouth every 8 hours  -- Finish all this medication unless otherwise directed by prescriber.  Take with food or milk.    zolpidem 5 mg oral tablet  -- 1 tab(s) by mouth once a day (at bedtime)    pantoprazole 40 mg oral delayed release tablet  -- 1 tab(s) by mouth once a day (before a meal)

## 2017-12-10 LAB
CULTURE RESULTS: SIGNIFICANT CHANGE UP
SPECIMEN SOURCE: SIGNIFICANT CHANGE UP

## 2017-12-19 PROCEDURE — C1785: CPT

## 2017-12-19 PROCEDURE — 82550 ASSAY OF CK (CPK): CPT

## 2017-12-19 PROCEDURE — 82553 CREATINE MB FRACTION: CPT

## 2017-12-19 PROCEDURE — 80048 BASIC METABOLIC PNL TOTAL CA: CPT

## 2017-12-19 PROCEDURE — 80053 COMPREHEN METABOLIC PANEL: CPT

## 2017-12-19 PROCEDURE — 85730 THROMBOPLASTIN TIME PARTIAL: CPT

## 2017-12-19 PROCEDURE — 87086 URINE CULTURE/COLONY COUNT: CPT

## 2017-12-19 PROCEDURE — 99285 EMERGENCY DEPT VISIT HI MDM: CPT

## 2017-12-19 PROCEDURE — 85610 PROTHROMBIN TIME: CPT

## 2017-12-19 PROCEDURE — 85027 COMPLETE CBC AUTOMATED: CPT

## 2017-12-19 PROCEDURE — 33228 REMV&REPLC PM GEN DUAL LEAD: CPT | Mod: KX

## 2017-12-19 PROCEDURE — 81001 URINALYSIS AUTO W/SCOPE: CPT

## 2017-12-19 PROCEDURE — 84484 ASSAY OF TROPONIN QUANT: CPT

## 2018-02-02 ENCOUNTER — RESULT REVIEW (OUTPATIENT)
Age: 83
End: 2018-02-02

## 2018-02-02 ENCOUNTER — APPOINTMENT (OUTPATIENT)
Dept: OBGYN | Facility: CLINIC | Age: 83
End: 2018-02-02
Payer: MEDICARE

## 2018-02-02 PROCEDURE — 58100 BIOPSY OF UTERUS LINING: CPT

## 2018-05-16 ENCOUNTER — INPATIENT (INPATIENT)
Facility: HOSPITAL | Age: 83
LOS: 5 days | Discharge: STILL ADMITTED OR ADMINISTRATIVE | End: 2018-05-22
Attending: INTERNAL MEDICINE | Admitting: INTERNAL MEDICINE
Payer: MEDICARE

## 2018-05-16 VITALS
OXYGEN SATURATION: 95 % | HEART RATE: 81 BPM | WEIGHT: 160.06 LBS | RESPIRATION RATE: 20 BRPM | DIASTOLIC BLOOD PRESSURE: 93 MMHG | SYSTOLIC BLOOD PRESSURE: 130 MMHG | TEMPERATURE: 98 F | HEIGHT: 68 IN

## 2018-05-16 DIAGNOSIS — Z98.89 OTHER SPECIFIED POSTPROCEDURAL STATES: Chronic | ICD-10-CM

## 2018-05-16 DIAGNOSIS — J69.0 PNEUMONITIS DUE TO INHALATION OF FOOD AND VOMIT: ICD-10-CM

## 2018-05-16 DIAGNOSIS — I71.2 THORACIC AORTIC ANEURYSM, WITHOUT RUPTURE: ICD-10-CM

## 2018-05-16 DIAGNOSIS — S72.91XA UNSPECIFIED FRACTURE OF RIGHT FEMUR, INITIAL ENCOUNTER FOR CLOSED FRACTURE: Chronic | ICD-10-CM

## 2018-05-16 DIAGNOSIS — J44.9 CHRONIC OBSTRUCTIVE PULMONARY DISEASE, UNSPECIFIED: ICD-10-CM

## 2018-05-16 DIAGNOSIS — I82.4Y2 ACUTE EMBOLISM AND THROMBOSIS OF UNSPECIFIED DEEP VEINS OF LEFT PROXIMAL LOWER EXTREMITY: ICD-10-CM

## 2018-05-16 LAB
ALBUMIN SERPL ELPH-MCNC: 2.8 G/DL — LOW (ref 3.3–5)
ALP SERPL-CCNC: 181 U/L — HIGH (ref 40–120)
ALT FLD-CCNC: 25 U/L — SIGNIFICANT CHANGE UP (ref 12–78)
ANION GAP SERPL CALC-SCNC: 4 MMOL/L — LOW (ref 5–17)
APPEARANCE UR: ABNORMAL
APTT BLD: 33 SEC — SIGNIFICANT CHANGE UP (ref 27.5–37.4)
AST SERPL-CCNC: 62 U/L — HIGH (ref 15–37)
BACTERIA # UR AUTO: ABNORMAL
BASOPHILS # BLD AUTO: 0.04 K/UL — SIGNIFICANT CHANGE UP (ref 0–0.2)
BASOPHILS NFR BLD AUTO: 0.5 % — SIGNIFICANT CHANGE UP (ref 0–2)
BILIRUB SERPL-MCNC: 0.5 MG/DL — SIGNIFICANT CHANGE UP (ref 0.2–1.2)
BILIRUB UR-MCNC: NEGATIVE — SIGNIFICANT CHANGE UP
BUN SERPL-MCNC: 21 MG/DL — SIGNIFICANT CHANGE UP (ref 7–23)
CALCIUM SERPL-MCNC: 9 MG/DL — SIGNIFICANT CHANGE UP (ref 8.5–10.1)
CHLORIDE SERPL-SCNC: 104 MMOL/L — SIGNIFICANT CHANGE UP (ref 96–108)
CK MB CFR SERPL CALC: <0.5 NG/ML — SIGNIFICANT CHANGE UP (ref 0.5–3.6)
CK SERPL-CCNC: 82 U/L — SIGNIFICANT CHANGE UP (ref 26–192)
CO2 SERPL-SCNC: 29 MMOL/L — SIGNIFICANT CHANGE UP (ref 22–31)
COLOR SPEC: YELLOW — SIGNIFICANT CHANGE UP
CREAT SERPL-MCNC: 0.82 MG/DL — SIGNIFICANT CHANGE UP (ref 0.5–1.3)
DIFF PNL FLD: NEGATIVE — SIGNIFICANT CHANGE UP
EOSINOPHIL # BLD AUTO: 0 K/UL — SIGNIFICANT CHANGE UP (ref 0–0.5)
EOSINOPHIL NFR BLD AUTO: 0 % — SIGNIFICANT CHANGE UP (ref 0–6)
EPI CELLS # UR: SIGNIFICANT CHANGE UP
FLUAV SPEC QL CULT: NEGATIVE — SIGNIFICANT CHANGE UP
FLUBV AG SPEC QL IA: NEGATIVE — SIGNIFICANT CHANGE UP
GLUCOSE SERPL-MCNC: 87 MG/DL — SIGNIFICANT CHANGE UP (ref 70–99)
GLUCOSE UR QL: NEGATIVE MG/DL — SIGNIFICANT CHANGE UP
HCT VFR BLD CALC: 36.8 % — SIGNIFICANT CHANGE UP (ref 34.5–45)
HGB BLD-MCNC: 11.4 G/DL — LOW (ref 11.5–15.5)
IMM GRANULOCYTES NFR BLD AUTO: 0.8 % — SIGNIFICANT CHANGE UP (ref 0–1.5)
INR BLD: 1.37 RATIO — HIGH (ref 0.88–1.16)
KETONES UR-MCNC: NEGATIVE — SIGNIFICANT CHANGE UP
LACTATE SERPL-SCNC: 1.1 MMOL/L — SIGNIFICANT CHANGE UP (ref 0.7–2)
LEUKOCYTE ESTERASE UR-ACNC: ABNORMAL
LYMPHOCYTES # BLD AUTO: 1.42 K/UL — SIGNIFICANT CHANGE UP (ref 1–3.3)
LYMPHOCYTES # BLD AUTO: 17.9 % — SIGNIFICANT CHANGE UP (ref 13–44)
MCHC RBC-ENTMCNC: 31 GM/DL — LOW (ref 32–36)
MCHC RBC-ENTMCNC: 31.3 PG — SIGNIFICANT CHANGE UP (ref 27–34)
MCV RBC AUTO: 101.1 FL — HIGH (ref 80–100)
MONOCYTES # BLD AUTO: 0.6 K/UL — SIGNIFICANT CHANGE UP (ref 0–0.9)
MONOCYTES NFR BLD AUTO: 7.5 % — SIGNIFICANT CHANGE UP (ref 2–14)
NEUTROPHILS # BLD AUTO: 5.83 K/UL — SIGNIFICANT CHANGE UP (ref 1.8–7.4)
NEUTROPHILS NFR BLD AUTO: 73.3 % — SIGNIFICANT CHANGE UP (ref 43–77)
NITRITE UR-MCNC: NEGATIVE — SIGNIFICANT CHANGE UP
NRBC # BLD: 0 /100 WBCS — SIGNIFICANT CHANGE UP (ref 0–0)
NT-PROBNP SERPL-SCNC: 927 PG/ML — HIGH (ref 0–450)
PH UR: 6 — SIGNIFICANT CHANGE UP (ref 5–8)
PLATELET # BLD AUTO: 205 K/UL — SIGNIFICANT CHANGE UP (ref 150–400)
POTASSIUM SERPL-MCNC: 5.3 MMOL/L — SIGNIFICANT CHANGE UP (ref 3.5–5.3)
POTASSIUM SERPL-SCNC: 5.3 MMOL/L — SIGNIFICANT CHANGE UP (ref 3.5–5.3)
PROT SERPL-MCNC: 7.6 GM/DL — SIGNIFICANT CHANGE UP (ref 6–8.3)
PROT UR-MCNC: NEGATIVE MG/DL — SIGNIFICANT CHANGE UP
PROTHROM AB SERPL-ACNC: 15 SEC — HIGH (ref 9.8–12.7)
RBC # BLD: 3.64 M/UL — LOW (ref 3.8–5.2)
RBC # FLD: 16.7 % — HIGH (ref 10.3–14.5)
RBC CASTS # UR COMP ASSIST: SIGNIFICANT CHANGE UP /HPF (ref 0–4)
SODIUM SERPL-SCNC: 137 MMOL/L — SIGNIFICANT CHANGE UP (ref 135–145)
SP GR SPEC: 1.02 — SIGNIFICANT CHANGE UP (ref 1.01–1.02)
TROPONIN I SERPL-MCNC: <.015 NG/ML — SIGNIFICANT CHANGE UP (ref 0.01–0.04)
UROBILINOGEN FLD QL: NEGATIVE MG/DL — SIGNIFICANT CHANGE UP
WBC # BLD: 7.95 K/UL — SIGNIFICANT CHANGE UP (ref 3.8–10.5)
WBC # FLD AUTO: 7.95 K/UL — SIGNIFICANT CHANGE UP (ref 3.8–10.5)
WBC UR QL: ABNORMAL

## 2018-05-16 PROCEDURE — 71045 X-RAY EXAM CHEST 1 VIEW: CPT | Mod: 26

## 2018-05-16 PROCEDURE — 74018 RADEX ABDOMEN 1 VIEW: CPT | Mod: 26

## 2018-05-16 PROCEDURE — 70450 CT HEAD/BRAIN W/O DYE: CPT | Mod: 26

## 2018-05-16 PROCEDURE — 70490 CT SOFT TISSUE NECK W/O DYE: CPT | Mod: 26

## 2018-05-16 PROCEDURE — 99285 EMERGENCY DEPT VISIT HI MDM: CPT

## 2018-05-16 PROCEDURE — 93970 EXTREMITY STUDY: CPT | Mod: 26

## 2018-05-16 PROCEDURE — 99223 1ST HOSP IP/OBS HIGH 75: CPT

## 2018-05-16 PROCEDURE — 71250 CT THORAX DX C-: CPT | Mod: 26

## 2018-05-16 RX ORDER — SODIUM CHLORIDE 9 MG/ML
3 INJECTION INTRAMUSCULAR; INTRAVENOUS; SUBCUTANEOUS ONCE
Qty: 0 | Refills: 0 | Status: COMPLETED | OUTPATIENT
Start: 2018-05-16 | End: 2018-05-16

## 2018-05-16 RX ORDER — DILTIAZEM HCL 120 MG
120 CAPSULE, EXT RELEASE 24 HR ORAL DAILY
Qty: 0 | Refills: 0 | Status: DISCONTINUED | OUTPATIENT
Start: 2018-05-16 | End: 2018-05-17

## 2018-05-16 RX ORDER — PIPERACILLIN AND TAZOBACTAM 4; .5 G/20ML; G/20ML
3.38 INJECTION, POWDER, LYOPHILIZED, FOR SOLUTION INTRAVENOUS EVERY 8 HOURS
Qty: 0 | Refills: 0 | Status: DISCONTINUED | OUTPATIENT
Start: 2018-05-16 | End: 2018-05-22

## 2018-05-16 RX ORDER — IPRATROPIUM/ALBUTEROL SULFATE 18-103MCG
3 AEROSOL WITH ADAPTER (GRAM) INHALATION EVERY 6 HOURS
Qty: 0 | Refills: 0 | Status: DISCONTINUED | OUTPATIENT
Start: 2018-05-16 | End: 2018-05-22

## 2018-05-16 RX ORDER — LOSARTAN POTASSIUM 100 MG/1
50 TABLET, FILM COATED ORAL DAILY
Qty: 0 | Refills: 0 | Status: DISCONTINUED | OUTPATIENT
Start: 2018-05-16 | End: 2018-05-17

## 2018-05-16 RX ORDER — PIPERACILLIN AND TAZOBACTAM 4; .5 G/20ML; G/20ML
3.38 INJECTION, POWDER, LYOPHILIZED, FOR SOLUTION INTRAVENOUS ONCE
Qty: 0 | Refills: 0 | Status: COMPLETED | OUTPATIENT
Start: 2018-05-16 | End: 2018-05-16

## 2018-05-16 RX ORDER — ASPIRIN/CALCIUM CARB/MAGNESIUM 324 MG
81 TABLET ORAL DAILY
Qty: 0 | Refills: 0 | Status: DISCONTINUED | OUTPATIENT
Start: 2018-05-16 | End: 2018-05-20

## 2018-05-16 RX ORDER — SODIUM CHLORIDE 9 MG/ML
1000 INJECTION, SOLUTION INTRAVENOUS
Qty: 0 | Refills: 0 | Status: DISCONTINUED | OUTPATIENT
Start: 2018-05-16 | End: 2018-05-22

## 2018-05-16 RX ORDER — ENOXAPARIN SODIUM 100 MG/ML
70 INJECTION SUBCUTANEOUS EVERY 12 HOURS
Qty: 0 | Refills: 0 | Status: DISCONTINUED | OUTPATIENT
Start: 2018-05-16 | End: 2018-05-22

## 2018-05-16 RX ORDER — IPRATROPIUM/ALBUTEROL SULFATE 18-103MCG
3 AEROSOL WITH ADAPTER (GRAM) INHALATION
Qty: 0 | Refills: 0 | Status: COMPLETED | OUTPATIENT
Start: 2018-05-16 | End: 2018-05-16

## 2018-05-16 RX ADMIN — SODIUM CHLORIDE 100 MILLILITER(S): 9 INJECTION, SOLUTION INTRAVENOUS at 21:53

## 2018-05-16 RX ADMIN — Medication 3 MILLILITER(S): at 12:16

## 2018-05-16 RX ADMIN — SODIUM CHLORIDE 3 MILLILITER(S): 9 INJECTION INTRAMUSCULAR; INTRAVENOUS; SUBCUTANEOUS at 12:50

## 2018-05-16 RX ADMIN — Medication 3 MILLILITER(S): at 12:36

## 2018-05-16 RX ADMIN — PIPERACILLIN AND TAZOBACTAM 200 GRAM(S): 4; .5 INJECTION, POWDER, LYOPHILIZED, FOR SOLUTION INTRAVENOUS at 13:11

## 2018-05-16 RX ADMIN — Medication 3 MILLILITER(S): at 12:56

## 2018-05-16 NOTE — H&P ADULT - NSHPREVIEWOFSYSTEMS_GEN_ALL_CORE

## 2018-05-16 NOTE — H&P ADULT - ASSESSMENT
88f with extensive thoracic aneurysm and recent inability to talk failed outpatient antibiotics x2 for possible aspiration pneumonia with new deep vein thrombosis     IMPROVE VTE Individual Risk Assessment          RISK                                                          Points    [  ] Previous VTE                                                3    [  ] Thrombophilia                                             2    [  ] Lower limb paralysis                                    2        (unable to hold up >15 seconds)      [  ] Current Cancer                                             2         (within 6 months)    [  x] Immobilization > 24 hrs                              1    [  ] ICU/CCU stay > 24 hours                            1    [  x] Age > 60                                                    1    IMPROVE VTE Score _2________

## 2018-05-16 NOTE — ED PROVIDER NOTE - OBJECTIVE STATEMENT
86 yo female with pmh HTN, COPD, CAD, A-fib, sick sinus syndrome s/p PPM (battery changed 2017), PAD, abdominal and thoracic aortic aneurysm s/p EVAR, breast ca s/p lumpectomy/LN resection with RUE lymphedema presents with cough x 3-4 weeks and worsening in past few days also notes unable to speak. At baseline, AOx3. Per daughter, aid states may have aspirated. cough is nonproductive.  + mild sob. no fever, sob, cp, palpitations. + constipation.     pulm: humefeld    ROS: No fever/chills. No photophobia/eye pain/changes in vision, No ear pain/sore throat/dysphagia, No chest pain/palpitations.  + SOB/cough, no stridor. No abdominal pain, N/V/D, no black/bloody bm. No dysuria/frequency/discharge, No headache. No Dizziness.  No rash.  No numbness/tingling/weakness. 88 yo female with pmh HTN, COPD, CAD, A-fib, sick sinus syndrome s/p PPM (battery changed 2017), PAD, abdominal and thoracic aortic aneurysm s/p EVAR, breast ca s/p lumpectomy/LN resection with RUE lymphedema presents with cough x 3-4 weeks and worsening in past few days also notes unable to speak x 2-3 weeks, + hoarse voice. also recently started on thickened liquids. Seen by pulm, given one abx, was told it was getting worse and gave another abx. At baseline, AOx3. per family, however also concerned regarding some dementia recently. Per daughter, aid states may have aspirated. cough is nonproductive.  + mild sob. no fever, sob, cp, palpitations. + constipation.     pulm: humefeld    ROS: No fever/chills. No photophobia/eye pain/changes in vision, No ear pain/sore throat/dysphagia, No chest pain/palpitations.  + SOB/cough, no stridor. No abdominal pain, N/V/D, no black/bloody bm. No dysuria/frequency/discharge, No headache. No Dizziness.  No rash.  No numbness/tingling/weakness.

## 2018-05-16 NOTE — ED PROVIDER NOTE - PHYSICAL EXAMINATION
Gen: Alert, Well appearing. NAD    Head: NC, AT, PERRL, EOMI, normal lids/conjunctiva   ENT: Bilateral TM WNL, normal hearing, patent oropharynx without erythema/exudate, uvula midline  Neck: supple, no tenderness/meningismus/JVD   Pulm: + rhonchi  CV: RRR, no M/R/G, +dist pulses   Abd: soft, NT/ND, +BS, no guarding/rebound tenderness  Mskel: no edema/erythema/cyanosis   Skin: no rash   Neuro: AAO, no sensory/motor deficits, CN 2-12 intact Gen: Alert, ++ audble rhonchi.   Head: NC, AT, PERRL, EOMI, normal lids/conjunctiva   ENT: Bilateral TM WNL, normal hearing, patent oropharynx without erythema/exudate, uvula midline  Neck: supple, no tenderness/meningismus/JVD   Pulm: + rhonchi  CV: RRR, no M/R/G, +dist pulses   Abd: soft, NT/ND, +BS, no guarding/rebound tenderness  Mskel: no edema/erythema/cyanosis   Skin: no rash   Neuro: alert and oriented, LE 3/5 b/k, UE 5/5

## 2018-05-16 NOTE — H&P ADULT - PMH
AAA (Abdominal Aortic Aneurysm)  current diagnosis  AF (Atrial Fibrillation)  diagnosed 2008  pt on sotalol and aspirin  Breast Ca  right s/p right lumpectomy axillary lymph node dissection 2000/ s/p chemo and radiation  current right lymphedema  Cardiac Pacemaker  medtronic placed 2010  Cellulitis  x2 2005  COPD (Chronic Obstructive Pulmonary Disease)    Gout  last attack 15 years ago  Hiatal Hernia  s/p fundoplication 1964  Hypertension    OA (Osteoarthritis)  b/l knee and right hip  s/p fall 8-1010  seen [facility]    Thoracic Aneurysm  pt had scan 8-2011  UTI (Lower Urinary Tract Infection)  chronic pt on antibiotics, no current symptoms

## 2018-05-16 NOTE — H&P ADULT - NSHPPHYSICALEXAM_GEN_ALL_CORE
GENERAL: NAD well-developed  HEAD:  Atraumatic, Normocephalic  EYES: EOMI, PERRLA, conjunctiva and sclera clear  ENMT: No tonsillar erythema, exudates, or enlargement; Moist mucous membranes, Good dentition, No lesions  NECK: Supple, No JVD, Normal thyroid  NERVOUS SYSTEM:  Alert & Oriented X3, Good concentration; Motor Strength 5/5 B/L upper and lower extremities; DTRs 2+ intact and symmetric  CHEST/LUNG: Clear to percussion bilaterally; No rales, rhonchi, wheezing, or rubs  HEART: Regular rate and rhythm; No murmurs, rubs, or gallops  ABDOMEN: Soft, Nontender, Nondistended; Bowel sounds present  EXTREMITIES:  2+ Peripheral Pulses, No clubbing, cyanosis, or edema  LYMPH: No lymphadenopathy   SKIN: No rashes or lesions GENERAL: NAD well-developed  HEAD:  Atraumatic, Normocephalic  EYES: EOMI, PERRLA, conjunctiva and sclera clear  ENMT: No tonsillar erythema, exudates, or enlargement; Moist mucous membranes, Good dentition, No lesions  NECK: Supple, No JVD, Normal thyroid  NERVOUS SYSTEM:  sleepy but answers questions yes and no  and nonverbal   CHEST/LUNG: Clear to percussion bilaterally; No rales, rhonchi, wheezing, or rubs  HEART: Regular rate and rhythm; No murmurs, rubs, or gallops  ABDOMEN: Soft, Nontender, Nondistended; Bowel sounds present  EXTREMITIES:  2+ Peripheral Pulses, No clubbing, cyanosis, or edema  LYMPH: No lymphadenopathy   SKIN: No rashes or lesions

## 2018-05-16 NOTE — ED ADULT TRIAGE NOTE - CHIEF COMPLAINT QUOTE
BIBA for cough which is progressingly becoming worse over 1 week, worsens with eating and drinking. Family denies fever, chills, nausea, vomiting.

## 2018-05-16 NOTE — H&P ADULT - HISTORY OF PRESENT ILLNESS
HPI Objective Statement: 86 yo female with pmh HTN, COPD, CAD, A-fib, sick sinus syndrome s/p PPM (battery changed 2017), PAD, abdominal and thoracic aortic aneurysm s/p EVAR, breast ca s/p lumpectomy/LN resection with RUE lymphedema presents with cough x 3-4 weeks and worsening in past few days also notes unable to speak x 2-3 weeks, + hoarse voice. also recently started on thickened liquids. Seen by pulm, given one abx, was told it was getting worse and gave another abx. At baseline, AOx3. per family, however also concerned regarding some dementia recently. Per daughter, aid states may have aspirated. cough is nonproductive.  + mild sob. no fever, sob, cp, palpitations. + constipation.

## 2018-05-16 NOTE — ED PROVIDER NOTE - CARE PLAN
Principal Discharge DX:	Pneumonia  Secondary Diagnosis:	COPD (chronic obstructive pulmonary disease)  Secondary Diagnosis:	Thoracic aortic aneurysm

## 2018-05-16 NOTE — H&P ADULT - NSHPLABSRESULTS_GEN_ALL_CORE
11.4   7.95  )-----------( 205      ( 16 May 2018 12:57 )             36.8   05-16    137  |  104  |  21  ----------------------------<  87  5.3   |  29  |  0.82    Ca    9.0      16 May 2018 12:57    TPro  7.6  /  Alb  2.8<L>  /  TBili  0.5  /  DBili  x   /  AST  62<H>  /  ALT  25  /  AlkPhos  181<H>  05-16      < from: CT Chest No Cont (05.16.18 @ 14:34) >    MPRESSION: Small left pleural effusion  Extensive bronchiectasis left lower lobe with some superimposed density   which may represent superimposed pneumonia  Significant increase in the size of the aortic arch aneurysm which   measures up to 7 cm. There is resultant volume loss in the left upper   lobe.  Results were discussed with Dr. Ro at 3:30 PM on and a 15 2015    < from: CT Head No Cont (05.16.18 @ 14:34) >    MPRESSION:    1)  extensive chronic ischemic changes throughout both hemispheres with   atrophy.  2)  no acute abnormality or space occupying lesion.  < from: Xray Chest 1 View AP/PA. (05.16.18 @ 12:45) >    IMPRESSION: Unchanged chest showing large thoracic aneurysm, pacemaker,   and incidental findings as above.

## 2018-05-17 DIAGNOSIS — Z71.89 OTHER SPECIFIED COUNSELING: ICD-10-CM

## 2018-05-17 DIAGNOSIS — G52.2 DISORDERS OF VAGUS NERVE: ICD-10-CM

## 2018-05-17 LAB
ANION GAP SERPL CALC-SCNC: 5 MMOL/L — SIGNIFICANT CHANGE UP (ref 5–17)
BUN SERPL-MCNC: 17 MG/DL — SIGNIFICANT CHANGE UP (ref 7–23)
CALCIUM SERPL-MCNC: 8 MG/DL — SIGNIFICANT CHANGE UP (ref 8.5–10.1)
CHLORIDE SERPL-SCNC: 110 MMOL/L — HIGH (ref 96–108)
CO2 SERPL-SCNC: 27 MMOL/L — SIGNIFICANT CHANGE UP (ref 22–31)
CREAT SERPL-MCNC: 0.63 MG/DL — SIGNIFICANT CHANGE UP (ref 0.5–1.3)
CULTURE RESULTS: NO GROWTH — SIGNIFICANT CHANGE UP
GLUCOSE SERPL-MCNC: 106 MG/DL — HIGH (ref 70–99)
HCT VFR BLD CALC: 34.7 % — SIGNIFICANT CHANGE UP (ref 34.5–45)
HGB BLD-MCNC: 10.4 G/DL — LOW (ref 11.5–15.5)
MCHC RBC-ENTMCNC: 30 GM/DL — LOW (ref 32–36)
MCHC RBC-ENTMCNC: 31.6 PG — SIGNIFICANT CHANGE UP (ref 27–34)
MCV RBC AUTO: 105.5 FL — HIGH (ref 80–100)
NRBC # BLD: 0 /100 WBCS — SIGNIFICANT CHANGE UP (ref 0–0)
PLATELET # BLD AUTO: 152 K/UL — SIGNIFICANT CHANGE UP (ref 150–400)
POTASSIUM SERPL-MCNC: 4.2 MMOL/L — SIGNIFICANT CHANGE UP (ref 3.5–5.3)
POTASSIUM SERPL-SCNC: 4.2 MMOL/L — SIGNIFICANT CHANGE UP (ref 3.5–5.3)
RBC # BLD: 3.29 M/UL — LOW (ref 3.8–5.2)
RBC # FLD: 16.6 % — HIGH (ref 10.3–14.5)
SODIUM SERPL-SCNC: 142 MMOL/L — SIGNIFICANT CHANGE UP (ref 135–145)
SPECIMEN SOURCE: SIGNIFICANT CHANGE UP
WBC # BLD: 6.15 K/UL — SIGNIFICANT CHANGE UP (ref 3.8–10.5)
WBC # FLD AUTO: 6.15 K/UL — SIGNIFICANT CHANGE UP (ref 3.8–10.5)

## 2018-05-17 PROCEDURE — 99497 ADVNCD CARE PLAN 30 MIN: CPT

## 2018-05-17 PROCEDURE — 99498 ADVNCD CARE PLAN ADDL 30 MIN: CPT

## 2018-05-17 PROCEDURE — 93010 ELECTROCARDIOGRAM REPORT: CPT

## 2018-05-17 PROCEDURE — 99233 SBSQ HOSP IP/OBS HIGH 50: CPT

## 2018-05-17 RX ORDER — METOPROLOL TARTRATE 50 MG
2.5 TABLET ORAL EVERY 8 HOURS
Qty: 0 | Refills: 0 | Status: DISCONTINUED | OUTPATIENT
Start: 2018-05-17 | End: 2018-05-22

## 2018-05-17 RX ADMIN — PIPERACILLIN AND TAZOBACTAM 25 GRAM(S): 4; .5 INJECTION, POWDER, LYOPHILIZED, FOR SOLUTION INTRAVENOUS at 21:27

## 2018-05-17 RX ADMIN — Medication 3 MILLILITER(S): at 23:18

## 2018-05-17 RX ADMIN — Medication 3 MILLILITER(S): at 03:00

## 2018-05-17 RX ADMIN — SODIUM CHLORIDE 100 MILLILITER(S): 9 INJECTION, SOLUTION INTRAVENOUS at 05:37

## 2018-05-17 RX ADMIN — PIPERACILLIN AND TAZOBACTAM 25 GRAM(S): 4; .5 INJECTION, POWDER, LYOPHILIZED, FOR SOLUTION INTRAVENOUS at 14:14

## 2018-05-17 RX ADMIN — PIPERACILLIN AND TAZOBACTAM 25 GRAM(S): 4; .5 INJECTION, POWDER, LYOPHILIZED, FOR SOLUTION INTRAVENOUS at 05:37

## 2018-05-17 RX ADMIN — ENOXAPARIN SODIUM 70 MILLIGRAM(S): 100 INJECTION SUBCUTANEOUS at 05:37

## 2018-05-17 RX ADMIN — PIPERACILLIN AND TAZOBACTAM 25 GRAM(S): 4; .5 INJECTION, POWDER, LYOPHILIZED, FOR SOLUTION INTRAVENOUS at 02:15

## 2018-05-17 RX ADMIN — ENOXAPARIN SODIUM 70 MILLIGRAM(S): 100 INJECTION SUBCUTANEOUS at 18:03

## 2018-05-17 RX ADMIN — Medication 3 MILLILITER(S): at 18:05

## 2018-05-17 RX ADMIN — Medication 3 MILLILITER(S): at 06:10

## 2018-05-17 RX ADMIN — Medication 2.5 MILLIGRAM(S): at 17:53

## 2018-05-17 NOTE — CONSULT NOTE ADULT - ASSESSMENT
LLL Pneumonia / aspiration   Recommendations  aspiration precautions   continue zosyn   f/u cultures  procalcitonin   f/u CXR in few days   Thank you for this consult  will f/u the patient with you ? LLL Pneumonia / aspiration   Recommendations  aspiration precautions   continue zosyn   f/u cultures  procalcitonin   f/u CXR in few days   Thank you for this consult  will f/u the patient with you

## 2018-05-17 NOTE — SWALLOW BEDSIDE ASSESSMENT ADULT - H & P REVIEW
yes/HPI Objective Statement: 88 yo female with pmh HTN, COPD, CAD, A-fib, sick sinus syndrome s/p PPM (battery changed 2017), PAD, abdominal and thoracic aortic aneurysm s/p EVAR, breast ca s/p lumpectomy/LN resection with RUE lymphedema presents with cough x 3-4 weeks and worsening in past few days also notes unable to speak x 2-3 weeks, + hoarse voice. also recently started on thickened liquids. Seen by pulm, given one abx, was told it was getting worse and gave another abx. At baseline, AOx3. per family, however also concerned regarding some dementia recently. Per daughter, aid states may have aspirated. cough is nonproductive.  + mild sob. no fever, sob, cp, palpitations. + constipation.

## 2018-05-17 NOTE — SWALLOW BEDSIDE ASSESSMENT ADULT - PHARYNGEAL PHASE
noted pt with facial grimace but she denies pain/Delayed cough post oral intake/Cough post oral intake/Multiple swallows/Decreased laryngeal elevation/Delayed pharyngeal swallow Cough post oral intake/Delayed cough post oral intake/Delayed pharyngeal swallow/Decreased laryngeal elevation/pt with facial grimace/Multiple swallows Multiple swallows/Delayed pharyngeal swallow/Decreased laryngeal elevation/pt with facial grimace

## 2018-05-17 NOTE — SWALLOW BEDSIDE ASSESSMENT ADULT - COMMENTS
CT chest no contrast 5/16/2018IMPRESSION: Small left pleural effusionExtensive bronchiectasis left lower lobe with some superimposed density   which may represent superimposed pneumoniaSignificant increase in the size of the aortic arch aneurysm which measures up to 7 cm. There is resultant volume loss in the left upper lobe.    CT head 5/16/2018 IMPRESSION:  1)  extensive chronic ischemic changes throughout both hemispheres with atrophy.  2)  no acute abnormality or space occupying lesion

## 2018-05-17 NOTE — CONSULT NOTE ADULT - SUBJECTIVE AND OBJECTIVE BOX
Patient is a 89y old  Female who presents with a chief complaint of HPI:  HPI Objective Statement: 86 yo female with pmh HTN, COPD, CAD, A-fib, sick sinus syndrome s/p PPM (battery changed ), PAD, abdominal and thoracic aortic aneurysm s/p EVAR, breast ca s/p lumpectomy/LN resection with RUE lymphedema presents with cough x 3-4 weeks and worsening in past few days also notes unable to speak x 2-3 weeks, + hoarse voice. also recently started on thickened liquids. Seen by pulm, given one abx, was told it was getting worse and gave another abx. At baseline, AOx3. per family, however also concerned regarding some dementia recently. Per daughter, aid states may have aspirated. cough is nonproductive.  + mild sob. no fever, sob, cp, palpitations. + constipation.PAST MEDICAL & SURGICAL HISTORY:  Cellulitis: x2   s/p fall 8-1010  seen [facility]  UTI (Lower Urinary Tract Infection): chronic pt on antibiotics, no current symptoms  Hiatal Hernia: s/p fundoplication   OA (Osteoarthritis): b/l knee and right hip  Cardiac Pacemaker: medtronic placed   AF (Atrial Fibrillation): diagnosed   pt on sotalol and aspirin  Breast Ca: right s/p right lumpectomy axillary lymph node dissection / s/p chemo and radiation  current right lymphedema  Thoracic Aneurysm: pt had scan   AAA (Abdominal Aortic Aneurysm): current diagnosis  Gout: last attack 15 years ago  COPD (Chronic Obstructive Pulmonary Disease)  Hypertension  H/O dilation and curettage: 2015  Femur fracture, right: s/p repair 2014  S/P Colonoscopy:  normal  Cardiac Pacemaker:   medtronic  S/P Lumpectomy of Breast: right s/p axillary lymph node dissection/ current lymphadema  Hiatal Hernia: s/p fundoplication   S/P Hernia Repair: right inguinal hernia repair   S/P Appendectomy:   S/P Tonsillectomy: childhood  Thoracic Aneurysm  FAMILY HISTORY:  No pertinent family history in first degree relatives  Allergies    No Known Drug Allergies  shellfish (Rash)    Intolerances    MEDICATIONS  (STANDING):  ALBUTerol/ipratropium for Nebulization 3 milliLiter(s) Nebulizer every 6 hours  aspirin enteric coated 81 milliGRAM(s) Oral daily  dextrose 5% + sodium chloride 0.9%. 1000 milliLiter(s) (100 mL/Hr) IV Continuous <Continuous>  enoxaparin Injectable 70 milliGRAM(s) SubCutaneous every 12 hours  piperacillin/tazobactam IVPB. 3.375 Gram(s) IV Intermittent every 8 hours    MEDICATIONS  (PRN):  metoprolol tartrate Injectable 2.5 milliGRAM(s) IV Push every 8 hours PRN for sbp >150    ROS  constitutional - denies any fever, weight loss or weight gain , normal appetite   pulmonary- c/o cough , productive , shortness of breath   cardiovascular- denies any chest pain  GI- denies any nausea , vomiting or diarrhea  Neuro- denies any dizziness or weaknessVital Signs Last 24 Hrs  T(C): 36.7 (17 May 2018 17:35), Max: 37.1 (17 May 2018 00:12)  T(F): 98 (17 May 2018 17:35), Max: 98.7 (17 May 2018 00:12)  HR: 123 (17 May 2018 18:33) (64 - 127)  BP: 143/98 (17 May 2018 18:33) (113/85 - 151/116)  BP(mean): --  RR: 22 (17 May 2018 17:35) (18 - 22)  SpO2: 95% (17 May 2018 18:05) (95% - 99%)  Physical Exam  patient lying in bed in no respiratory distress  HEENT - EOMI, PERRLA ,neck supple , No JVD  lungs - decreased BS, no wheezing , ronchi or rales   COR- [id number] , no murmer  Abd- soft, BS+, no tenderness, no guarding   ext- ecc neg, good pulses  Neuro - Alert , oriented X3   Skin- No rashes     Urinalysis Basic - ( 16 May 2018 18:22 )    Color: Yellow / Appearance: Slightly Turbid / S.020 / pH: x  Gluc: x / Ketone: Negative  / Bili: Negative / Urobili: Negative mg/dL   Blood: x / Protein: Negative mg/dL / Nitrite: Negative   Leuk Esterase: Trace / RBC: 0-2 /HPF / WBC 6-10   Sq Epi: x / Non Sq Epi: Few / Bacteria: Many    PT/INR - ( 16 May 2018 12:57 )   PT: 15.0 sec;   INR: 1.37 ratio         PTT - ( 16 May 2018 12:57 )  PTT:33.0 secCARDIAC MARKERS ( 16 May 2018 12:57 )  <.015 ng/mL / x     / 82 U/L / x     / <0.5 ng/mL                          10.4   6.15  )-----------( 152      ( 17 May 2018 08:52 )             34.7   05-    142  |  110<H>  |  17  ----------------------------<  106<H>  4.2   |  27  |  0.63    Ca    8.0      17 May 2018 10:28    TPro  7.6  /  Alb  2.8<L>  /  TBili  0.5  /  DBili  x   /  AST  62<H>  /  ALT  25  /  AlkPhos  181<H>  05-16      CXR LLL infiltrate/ bronchiectasis

## 2018-05-17 NOTE — PROGRESS NOTE ADULT - SUBJECTIVE AND OBJECTIVE BOX
Patient is a 89y old  Female who presents with a chief complaint of     OVERNIGHT EVENTS:      REVIEW OF SYSTEMS: denies chest pain/SOB, diaphoresis, no F/C, cough, dizziness, headache, blurry vision, nausea, vomiting, abdominal pain. Rest unremarkable     MEDICATIONS  (STANDING):  ALBUTerol/ipratropium for Nebulization 3 milliLiter(s) Nebulizer every 6 hours  aspirin enteric coated 81 milliGRAM(s) Oral daily  dextrose 5% + sodium chloride 0.9%. 1000 milliLiter(s) (100 mL/Hr) IV Continuous <Continuous>  diltiazem    milliGRAM(s) Oral daily  enoxaparin Injectable 70 milliGRAM(s) SubCutaneous every 12 hours  losartan 50 milliGRAM(s) Oral daily  piperacillin/tazobactam IVPB. 3.375 Gram(s) IV Intermittent every 8 hours    MEDICATIONS  (PRN):      Allergies    No Known Drug Allergies  shellfish (Rash)    Intolerances        SUBJECTIVE: in bed in NAD, no acute events overnight     T(F): 97.5 (18 @ 03:30), Max: 98.7 (18 @ 00:12)  HR: 64 (18 @ 11:31) (64 - 83)  BP: 139/76 (18 @ 11:31) (113/85 - 139/76)  RR: 20 (18 @ 11:31) (18 - 20)  SpO2: 96% (18 @ 11:31) (95% - 99%)  Wt(kg): --    PHYSICAL EXAM:  GENERAL: NAD, well-groomed, well-developed, elederly poor dentition     HEAD:  Atraumatic, Normocephalic  EYES: EOMI, PERRLA, conjunctiva and sclera clear  ENMT: No tonsillar erythema, exudates, or enlargement; Moist mucous membranes, poor dentition, No lesions, hypoohonic  NECK: Supple, No JVD, Normal thyroid  CHEST/LUNG: decreased bs at base   HEART: Regular rate and rhythm; No murmurs, rubs, or gallops  ABDOMEN: Soft, Nontender, Nondistended; Bowel sounds present  EXTREMITIES:  2+ Peripheral Pulses, No clubbing, cyanosis, or edema BL LE  SKIN: No rashes or lesions  NERVOUS SYSTEM:  Alert & Oriented X3, Good concentration; Motor Strength 4/5 B/L upper and lower extremities;   DTRs 2+ intact and symmetric, sensation intact BL    LABS:                        10.4   6.15  )-----------( 152      ( 17 May 2018 08:52 )             34.7         142  |  110<H>  |  17  ----------------------------<  106<H>  4.2   |  27  |  0.63    Ca    8.0      17 May 2018 10:28    TPro  7.6  /  Alb  2.8<L>  /  TBili  0.5  /  DBili  x   /  AST  62<H>  /  ALT  25  /  AlkPhos  181<H>      PT/INR - ( 16 May 2018 12:57 )   PT: 15.0 sec;   INR: 1.37 ratio         PTT - ( 16 May 2018 12:57 )  PTT:33.0 sec  Urinalysis Basic - ( 16 May 2018 18:22 )    Color: Yellow / Appearance: Slightly Turbid / S.020 / pH: x  Gluc: x / Ketone: Negative  / Bili: Negative / Urobili: Negative mg/dL   Blood: x / Protein: Negative mg/dL / Nitrite: Negative   Leuk Esterase: Trace / RBC: 0-2 /HPF / WBC 6-10   Sq Epi: x / Non Sq Epi: Few / Bacteria: Many      Cultures;   CAPILLARY BLOOD GLUCOSE        Lipid panel:     CARDIAC MARKERS ( 16 May 2018 12:57 )  <.015 ng/mL / x     / 82 U/L / x     / <0.5 ng/mL        RADIOLOGY & ADDITIONAL TESTS:    < from: Xray Kidney Ureter Bladder (18 @ 17:37) >  IMPRESSION:    Nonspecific intestinal gas pattern.      < from: US Duplex Venous Lower Ext Complete, Bilateral (18 @ 17:14) >    IMPRESSION:     Nonocclusive DVT at the junction of the left common femoral and femoral   veins.    No evidence of right lower extremity deep venous thrombosis.      < from: CT Chest No Cont (18 @ 14:34) >    IMPRESSION: Small left pleural effusion  Extensive bronchiectasis left lower lobe with some superimposed density   which may represent superimposed pneumonia  Significant increase in the size of the aortic arch aneurysm which   measures up to 7 cm. There is resultant volume loss in the left upper   lobe.    < from: CT Neck Soft Tissue No Cont (18 @ 14:34) >    IMPRESSION:      As seen on chest CT, there is a large aortic aneurysm, in the arch,   descending region. 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          < end of copied text >          Imaging Personally Reviewed:  [ x] YES      Consultant(s) Notes Reviewed:  [x ] YES     Care Discussed with [x ] Consultants [X ] Patient [x ] Family  [x ]    [x ]  Other; RN

## 2018-05-17 NOTE — PROGRESS NOTE ADULT - PROBLEM SELECTOR PLAN 2
family to bring in molst form   explained that size and serious nature of the aneurysm   they may be leaning towards hospice will get palliative eval   at present npo will use IV BP meds

## 2018-05-17 NOTE — SWALLOW BEDSIDE ASSESSMENT ADULT - SWALLOW EVAL: DIAGNOSIS
pt presented with oropharyngeal dysphagia marked by overall weakness, effortful respiration, baseline cough/upper airway rhonchi, reduced labial seal to cup and spoon, slow oral transport/ at times oral holding, and pt expectorated bolus, suspect delay in swallow trigger with reduced laryngeal elevation, multiple swallows and inconsistent cough across textures pt presented with oropharyngeal dysphagia marked by overall weakness, effortful respiration, baseline cough/upper airway rhonchi, reduced labial seal to cup and spoon, slow oral transport/ at times oral holding, and pt expectorated bolus, suspect delay in swallow trigger with reduced laryngeal elevation, multiple swallows and inconsistent cough across textures. pt at high risk for malnutrition, dehydration  and aspiration

## 2018-05-17 NOTE — SWALLOW BEDSIDE ASSESSMENT ADULT - SLP GENERAL OBSERVATIONS
pt seen bedside alert and oriented x2. pt responded to simple autobiographical/want questions with fair to good accuracy, she verbalized needs and was able to follow one step directions. noted voice dysphonic, raspy, low volume, and speech marked by decreased articulatory precision. dtrs present at the bedside

## 2018-05-18 LAB
ANION GAP SERPL CALC-SCNC: 8 MMOL/L — SIGNIFICANT CHANGE UP (ref 5–17)
BUN SERPL-MCNC: 9 MG/DL — SIGNIFICANT CHANGE UP (ref 7–23)
CALCIUM SERPL-MCNC: 7.6 MG/DL — LOW (ref 8.5–10.1)
CHLORIDE SERPL-SCNC: 111 MMOL/L — HIGH (ref 96–108)
CO2 SERPL-SCNC: 25 MMOL/L — SIGNIFICANT CHANGE UP (ref 22–31)
CREAT SERPL-MCNC: 0.74 MG/DL — SIGNIFICANT CHANGE UP (ref 0.5–1.3)
GLUCOSE SERPL-MCNC: 107 MG/DL — HIGH (ref 70–99)
HCT VFR BLD CALC: 35.3 % — SIGNIFICANT CHANGE UP (ref 34.5–45)
HGB BLD-MCNC: 10.8 G/DL — LOW (ref 11.5–15.5)
MAGNESIUM SERPL-MCNC: 1.7 MG/DL — SIGNIFICANT CHANGE UP (ref 1.6–2.6)
MCHC RBC-ENTMCNC: 30.6 GM/DL — LOW (ref 32–36)
MCHC RBC-ENTMCNC: 31.2 PG — SIGNIFICANT CHANGE UP (ref 27–34)
MCV RBC AUTO: 102 FL — HIGH (ref 80–100)
NRBC # BLD: 0 /100 WBCS — SIGNIFICANT CHANGE UP (ref 0–0)
PHOSPHATE SERPL-MCNC: 2 MG/DL — LOW (ref 2.5–4.5)
PLATELET # BLD AUTO: 185 K/UL — SIGNIFICANT CHANGE UP (ref 150–400)
POTASSIUM SERPL-MCNC: 3.5 MMOL/L — SIGNIFICANT CHANGE UP (ref 3.5–5.3)
POTASSIUM SERPL-SCNC: 3.5 MMOL/L — SIGNIFICANT CHANGE UP (ref 3.5–5.3)
PROCALCITONIN SERPL-MCNC: <0.05 — SIGNIFICANT CHANGE UP (ref 0–0.04)
RBC # BLD: 3.46 M/UL — LOW (ref 3.8–5.2)
RBC # FLD: 16.4 % — HIGH (ref 10.3–14.5)
SODIUM SERPL-SCNC: 144 MMOL/L — SIGNIFICANT CHANGE UP (ref 135–145)
WBC # BLD: 5.57 K/UL — SIGNIFICANT CHANGE UP (ref 3.8–10.5)
WBC # FLD AUTO: 5.57 K/UL — SIGNIFICANT CHANGE UP (ref 3.8–10.5)

## 2018-05-18 PROCEDURE — 99233 SBSQ HOSP IP/OBS HIGH 50: CPT

## 2018-05-18 PROCEDURE — 70371 SPEECH EVALUATION COMPLEX: CPT | Mod: 26

## 2018-05-18 RX ADMIN — Medication 3 MILLILITER(S): at 05:19

## 2018-05-18 RX ADMIN — Medication 3 MILLILITER(S): at 17:35

## 2018-05-18 RX ADMIN — PIPERACILLIN AND TAZOBACTAM 25 GRAM(S): 4; .5 INJECTION, POWDER, LYOPHILIZED, FOR SOLUTION INTRAVENOUS at 05:03

## 2018-05-18 RX ADMIN — PIPERACILLIN AND TAZOBACTAM 25 GRAM(S): 4; .5 INJECTION, POWDER, LYOPHILIZED, FOR SOLUTION INTRAVENOUS at 13:43

## 2018-05-18 RX ADMIN — Medication 3 MILLILITER(S): at 11:00

## 2018-05-18 RX ADMIN — ENOXAPARIN SODIUM 70 MILLIGRAM(S): 100 INJECTION SUBCUTANEOUS at 05:03

## 2018-05-18 RX ADMIN — SODIUM CHLORIDE 100 MILLILITER(S): 9 INJECTION, SOLUTION INTRAVENOUS at 01:07

## 2018-05-18 RX ADMIN — PIPERACILLIN AND TAZOBACTAM 25 GRAM(S): 4; .5 INJECTION, POWDER, LYOPHILIZED, FOR SOLUTION INTRAVENOUS at 21:23

## 2018-05-18 RX ADMIN — SODIUM CHLORIDE 100 MILLILITER(S): 9 INJECTION, SOLUTION INTRAVENOUS at 17:56

## 2018-05-18 RX ADMIN — ENOXAPARIN SODIUM 70 MILLIGRAM(S): 100 INJECTION SUBCUTANEOUS at 17:56

## 2018-05-18 RX ADMIN — Medication 2.5 MILLIGRAM(S): at 23:19

## 2018-05-18 NOTE — SWALLOW VFSS/MBS ASSESSMENT ADULT - DIAGNOSTIC IMPRESSIONS
pt presented with oral holding across liquid textures- suspect compensatory, reduced lingual strength/movement causing oral residue (increased with nectar thick/honey thick), premature spill into the hypopharynx at times with passive overflow to the PFS with thin liquid, and decreased laryngeal closure causing aspiration during the swallow with thin liquid/nectar thick liquids/honey thick, and inconsistent laryngeal penetration with puree, although pt initiated cough she was unable to clear. reduced BOT retraction/ pharyngeal squeeze causing residue along the BOT, in the valleculae- mild to moderate depending on the liquid texture, and reduced laryngeal elevation causing residue in the PFS. decreased Velopharyngeal closure causing backflow/nasal regurgitation with nectar thick and honey thick liquid into the nasopharynx

## 2018-05-18 NOTE — SWALLOW VFSS/MBS ASSESSMENT ADULT - COMMENTS
CTneck soft tissue no contrast 5/16/2018IMPRESSION:  As seen on chest CT, there is a large aortic aneurysm, in the arch, descending region. There is evidence of left vocal cord paralysis, indicative of recurrent laryngeal nerve injury and/or involvement.    CT chest no contrast 5/16/2018IMPRESSION: Small left pleural effusionExtensive bronchiectasis left lower lobe with some superimposed density   which may represent superimposed pneumoniaSignificant increase in the size of the aortic arch aneurysm which measures up to 7 cm. There is resultant volume loss in the left upper lobe.

## 2018-05-18 NOTE — SWALLOW VFSS/MBS ASSESSMENT ADULT - ROSENBEK'S PENETRATION ASPIRATION SCALE
(7) contrast passes glottis, visible subglottic residue remains despite patient’s response (aspiration) (5) contrast contacts vocal cords, visible residue remains (penetration) ?/(3) contrast remains above the vocal cords, visible residue remains (penetration)

## 2018-05-18 NOTE — SWALLOW VFSS/MBS ASSESSMENT ADULT - H & P REVIEW
HPI Objective Statement: 86 yo female with pmh HTN, COPD, CAD, A-fib, sick sinus syndrome s/p PPM (battery changed 2017), PAD, abdominal and thoracic aortic aneurysm s/p EVAR, breast ca s/p lumpectomy/LN resection with RUE lymphedema presents with cough x 3-4 weeks and worsening in past few days also notes unable to speak x 2-3 weeks, + hoarse voice. also recently started on thickened liquids. Seen by pulm, given one abx, was told it was getting worse and gave another abx. At baseline, AOx3. per family, however also concerned regarding some dementia recently. Per daughter, aid states may have aspirated. cough is nonproductive.  + mild sob. no fever, sob, cp, palpitations. + constipation./yes

## 2018-05-18 NOTE — PROGRESS NOTE ADULT - PROBLEM SELECTOR PLAN 5
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 will perform mbs  case d/w SLP       family does not want any feeding tubes

## 2018-05-18 NOTE — SWALLOW VFSS/MBS ASSESSMENT ADULT - UNSUCCESSFUL STRATEGIES TRIALED DURING PROCEDURE
hard swallow/nonproductive volitional cough following clinician cue nonproductive volitional cough following clinician cue/hard swallow

## 2018-05-18 NOTE — CONSULT NOTE ADULT - SUBJECTIVE AND OBJECTIVE BOX
HPI Objective Statement: 88 yo female with pmh HTN, COPD, CAD, A-fib, sick sinus syndrome s/p PPM (battery changed ), PAD, abdominal and thoracic aortic aneurysm s/p EVAR, breast ca s/p lumpectomy/LN resection with RUE lymphedema presents with cough x 3-4 weeks and worsening in past few days also notes unable to speak x 2-3 weeks, + hoarse voice. also recently started on thickened liquids. Seen by pulm, given one abx, was told it was getting worse and gave another abx. At baseline, AOx3. per family, however also concerned regarding some dementia recently. Per daughter, aid states may have aspirated. cough is nonproductive.  + mild sob. no fever, sob, cp, palpitations. + constipation. (16 May 2018 16:56)  Pt seen bedside, no acute complaints, denies dyspnea and cp.   Per daughters at bedside, patient has had a subjective decline over the past couple of weeks.  H/o of AAA repair x 8 over the past 15 years, most recently 2 years ago.     PAST MEDICAL & SURGICAL HISTORY:  Cellulitis: x2   s/p fall   seen [facility]  UTI (Lower Urinary Tract Infection): chronic pt on antibiotics, no current symptoms  Hiatal Hernia: s/p fundoplication   OA (Osteoarthritis): b/l knee and right hip  Cardiac Pacemaker: medtronic placed   AF (Atrial Fibrillation): diagnosed   pt on sotalol and aspirin  Breast Ca: right s/p right lumpectomy axillary lymph node dissection / s/p chemo and radiation  current right lymphedema  Thoracic Aneurysm: pt had scan   AAA (Abdominal Aortic Aneurysm): current diagnosis  Gout: last attack 15 years ago  COPD (Chronic Obstructive Pulmonary Disease)  Hypertension  H/O dilation and curettage: 2015  Femur fracture, right: s/p repair 2014  S/P Colonoscopy:  normal  Cardiac Pacemaker:   medtronic  S/P Lumpectomy of Breast: right s/p axillary lymph node dissection/ current lymphadema  Hiatal Hernia: s/p fundoplication   S/P Hernia Repair: right inguinal hernia repair   S/P Appendectomy:   S/P Tonsillectomy: childhood  Thoracic Aneurysm    Review of Systems:  No cp, dyspnea, abdominal pain.    MEDICATIONS  (STANDING):  ALBUTerol/ipratropium for Nebulization 3 milliLiter(s) Nebulizer every 6 hours  aspirin enteric coated 81 milliGRAM(s) Oral daily  dextrose 5% + sodium chloride 0.9%. 1000 milliLiter(s) (100 mL/Hr) IV Continuous <Continuous>  enoxaparin Injectable 70 milliGRAM(s) SubCutaneous every 12 hours  piperacillin/tazobactam IVPB. 3.375 Gram(s) IV Intermittent every 8 hours    MEDICATIONS  (PRN):  metoprolol tartrate Injectable 2.5 milliGRAM(s) IV Push every 8 hours PRN for sbp >150      Allergies  No Known Drug Allergies  shellfish (Rash)      SOCIAL HISTORY lives with daughter, with 24hr aid          FAMILY HISTORY:  No pertinent family history in first degree relatives      Vital Signs Last 24 Hrs  T(C): 36.2 (18 May 2018 17:22), Max: 36.6 (18 May 2018 05:33)  T(F): 97.2 (18 May 2018 17:22), Max: 97.8 (18 May 2018 05:33)  HR: 78 (18 May 2018 17:35) (58 - 123)  BP: 128/89 (18 May 2018 17:22) (120/91 - 149/94)  BP(mean): --  RR: 17 (18 May 2018 17:22) (17 - 20)  SpO2: 99% (18 May 2018 17:35) (95% - 100%)    Physical Exam:  General: Appears stated age, in NAD  Eyes: EOMI, conjunctiva clear  Chest: fair inspiratory effort, no wheezes, rhonchi or rales  Cardiovascular: +S1/2, irreg.  Abdomen: soft, nontender  Neuro/Psych:  Alert and awake    LABS:                        10.8   5.57  )-----------( 185      ( 18 May 2018 07:42 )             35.3     05-18    144  |  111<H>  |  9   ----------------------------<  107<H>  3.5   |  25  |  0.74    Ca    7.6<L>      18 May 2018 07:42  Phos  2.0     05-18  Mg     1.7     05-18        Urinalysis Basic - ( 16 May 2018 18:22 )    Color: Yellow / Appearance: Slightly Turbid / S.020 / pH: x  Gluc: x / Ketone: Negative  / Bili: Negative / Urobili: Negative mg/dL   Blood: x / Protein: Negative mg/dL / Nitrite: Negative   Leuk Esterase: Trace / RBC: 0-2 /HPF / WBC 6-10   Sq Epi: x / Non Sq Epi: Few / Bacteria: Many      Culture Results:   No growth ( @ 00:42)  Culture Results:   No growth to date. ( @ 18:25)  Culture Results:   No growth to date. ( @ 18:25)      RADIOLOGY & ADDITIONAL STUDIES:    Impression/Plan: HPI Objective Statement: 86 yo female with pmh HTN, COPD, CAD, A-fib, sick sinus syndrome s/p PPM (battery changed ), PAD, abdominal and thoracic aortic aneurysm s/p EVAR, breast ca s/p lumpectomy/LN resection with RUE lymphedema presents with cough x 3-4 weeks and worsening in past few days also notes unable to speak x 2-3 weeks, + hoarse voice. also recently started on thickened liquids. Seen by pulm, given one abx, was told it was getting worse and gave another abx. At baseline, AOx3. per family, however also concerned regarding some dementia recently. Per daughter, aid states may have aspirated. cough is nonproductive.  + mild sob. no fever, sob, cp, palpitations. + constipation. (16 May 2018 16:56)  Pt seen bedside, no acute complaints, denies dyspnea and cp.   Per daughters at bedside, patient has had a subjective decline over the past couple of weeks.  H/o of AAA repair x 8 over the past 15 years, most recently 2 years ago.     PAST MEDICAL & SURGICAL HISTORY:  Cellulitis: x2   s/p fall   seen [facility]  UTI (Lower Urinary Tract Infection): chronic pt on antibiotics, no current symptoms  Hiatal Hernia: s/p fundoplication   OA (Osteoarthritis): b/l knee and right hip  Cardiac Pacemaker: medtronic placed   AF (Atrial Fibrillation): diagnosed   pt on sotalol and aspirin  Breast Ca: right s/p right lumpectomy axillary lymph node dissection / s/p chemo and radiation  current right lymphedema  Thoracic Aneurysm: pt had scan   AAA (Abdominal Aortic Aneurysm): current diagnosis  Gout: last attack 15 years ago  COPD (Chronic Obstructive Pulmonary Disease)  Hypertension  H/O dilation and curettage: 2015  Femur fracture, right: s/p repair 2014  S/P Colonoscopy:  normal  Cardiac Pacemaker:   medtronic  S/P Lumpectomy of Breast: right s/p axillary lymph node dissection/ current lymphadema  Hiatal Hernia: s/p fundoplication   S/P Hernia Repair: right inguinal hernia repair   S/P Appendectomy:   S/P Tonsillectomy: childhood  Thoracic Aneurysm    Review of Systems:  No cp, dyspnea, abdominal pain.    MEDICATIONS  (STANDING):  ALBUTerol/ipratropium for Nebulization 3 milliLiter(s) Nebulizer every 6 hours  aspirin enteric coated 81 milliGRAM(s) Oral daily  dextrose 5% + sodium chloride 0.9%. 1000 milliLiter(s) (100 mL/Hr) IV Continuous <Continuous>  enoxaparin Injectable 70 milliGRAM(s) SubCutaneous every 12 hours  piperacillin/tazobactam IVPB. 3.375 Gram(s) IV Intermittent every 8 hours    MEDICATIONS  (PRN):  metoprolol tartrate Injectable 2.5 milliGRAM(s) IV Push every 8 hours PRN for sbp >150      Allergies  No Known Drug Allergies  shellfish (Rash)      SOCIAL HISTORY lives with daughter, with 24hr aid          FAMILY HISTORY:  No pertinent family history in first degree relatives      Vital Signs Last 24 Hrs  T(C): 36.2 (18 May 2018 17:22), Max: 36.6 (18 May 2018 05:33)  T(F): 97.2 (18 May 2018 17:22), Max: 97.8 (18 May 2018 05:33)  HR: 78 (18 May 2018 17:35) (58 - 123)  BP: 128/89 (18 May 2018 17:22) (120/91 - 149/94)  BP(mean): --  RR: 17 (18 May 2018 17:22) (17 - 20)  SpO2: 99% (18 May 2018 17:35) (95% - 100%)    Physical Exam:  General: Appears stated age, in NAD  Eyes: EOMI, conjunctiva clear  Chest: fair inspiratory effort, no wheezes, rhonchi or rales  Cardiovascular: +S1/2, irreg.  Abdomen: soft, nontender  Neuro/Psych:  Alert and awake  Ext: compression sleeve right upper extremity    LABS:                        10.8   5.57  )-----------( 185      ( 18 May 2018 07:42 )             35.3     05-18    144  |  111<H>  |  9   ----------------------------<  107<H>  3.5   |  25  |  0.74    Ca    7.6<L>      18 May 2018 07:42  Phos  2.0     05-18  Mg     1.7     05-18        Urinalysis Basic - ( 16 May 2018 18:22 )    Color: Yellow / Appearance: Slightly Turbid / S.020 / pH: x  Gluc: x / Ketone: Negative  / Bili: Negative / Urobili: Negative mg/dL   Blood: x / Protein: Negative mg/dL / Nitrite: Negative   Leuk Esterase: Trace / RBC: 0-2 /HPF / WBC 6-10   Sq Epi: x / Non Sq Epi: Few / Bacteria: Many      Culture Results:   No growth ( @ 00:42)  Culture Results:   No growth to date. ( @ 18:25)  Culture Results:   No growth to date. ( @ 18:25)      RADIOLOGY & ADDITIONAL STUDIES: < from: CT Chest No Cont (18 @ 14:34) >  IMPRESSION: Small left pleural effusion  Extensive bronchiectasis left lower lobe with some superimposed density   which may represent superimposed pneumonia  Significant increase in the size of the aortic arch aneurysm which   measures up to 7 cm. There is resultant volume loss in the left upper   lobe.    < end of copied text >      Impression/Plan: 86 yo female PMH HTN, COPD, CAD, A-fib, sick sinus syndrome s/p PPM (battery changed 2017), PAD, abdominal and thoracic aortic aneurysm s/p EVAR, breast ca s/p lumpectomy/LN resection with RUE lymphedema admitted with pneumonia, with 7cm aortic arch aneurysm, s/p failed speech and swallow eval today, refusing PEG placement, with LLE DVT on therapeutic lovenox  -as discussed with CT surgeon Dr Satya Wiggins, patient is not a candidate for an open cardiac procedure. Continue present medical management and supportive care. IV zosyn. Pt's family is not interested in surgical intervention but requested CT surgery input.

## 2018-05-18 NOTE — PROGRESS NOTE ADULT - SUBJECTIVE AND OBJECTIVE BOX
Patient is a 89y old  Female who presents with a chief complaint of     OVERNIGHT EVENTS: none       REVIEW OF SYSTEMS: denies chest pain/SOB, diaphoresis, no F/C, cough, dizziness, headache, blurry vision, nausea, vomiting, abdominal pain. Rest unremarkable   MEDICATIONS  (STANDING):  ALBUTerol/ipratropium for Nebulization 3 milliLiter(s) Nebulizer every 6 hours  aspirin enteric coated 81 milliGRAM(s) Oral daily  dextrose 5% + sodium chloride 0.9%. 1000 milliLiter(s) (100 mL/Hr) IV Continuous <Continuous>  enoxaparin Injectable 70 milliGRAM(s) SubCutaneous every 12 hours  piperacillin/tazobactam IVPB. 3.375 Gram(s) IV Intermittent every 8 hours    MEDICATIONS  (PRN):  metoprolol tartrate Injectable 2.5 milliGRAM(s) IV Push every 8 hours PRN for sbp >150    Allergies    No Known Drug Allergies  shellfish (Rash)    Intolerances        SUBJECTIVE: in bed in NAD, no acute events overnight     Vital Signs Last 24 Hrs  T(C): 36.6 (18 May 2018 05:33), Max: 36.7 (17 May 2018 17:35)  T(F): 97.8 (18 May 2018 05:33), Max: 98 (17 May 2018 17:35)  HR: 75 (18 May 2018 05:40) (64 - 127)  BP: 149/94 (18 May 2018 05:33) (120/91 - 151/116)  BP(mean): --  RR: 18 (18 May 2018 05:33) (18 - 22)  SpO2: 97% (18 May 2018 05:40) (95% - 100%)    PHYSICAL EXAM:  GENERAL: NAD, well-groomed, well-developed, elderly poor dentition     HEAD:  Atraumatic, Normocephalic  EYES: EOMI, PERRLA, conjunctiva and sclera clear  ENMT: No tonsillar erythema, exudates, or enlargement; Moist mucous membranes, poor dentition, No lesions, hypophonic  NECK: Supple, No JVD, Normal thyroid  CHEST/LUNG: decreased bs at base   HEART: Regular rate and rhythm; No murmurs, rubs, or gallops  ABDOMEN: Soft, Nontender, Nondistended; Bowel sounds present  EXTREMITIES:  2+ Peripheral Pulses, No clubbing, cyanosis, or edema BL LE  SKIN: No rashes or lesions  NERVOUS SYSTEM:  Alert & Oriented X3, Good concentration; Motor Strength 4/5 B/L upper and lower extremities;   DTRs 2+ intact and symmetric, sensation intact BL    LABS:                        10.4   6.15  )-----------( 152      ( 17 May 2018 08:52 )             34.7         142  |  110<H>  |  17  ----------------------------<  106<H>  4.2   |  27  |  0.63    Ca    8.0      17 May 2018 10:28    TPro  7.6  /  Alb  2.8<L>  /  TBili  0.5  /  DBili  x   /  AST  62<H>  /  ALT  25  /  AlkPhos  181<H>      PT/INR - ( 16 May 2018 12:57 )   PT: 15.0 sec;   INR: 1.37 ratio         PTT - ( 16 May 2018 12:57 )  PTT:33.0 sec  Urinalysis Basic - ( 16 May 2018 18:22 )    Color: Yellow / Appearance: Slightly Turbid / S.020 / pH: x  Gluc: x / Ketone: Negative  / Bili: Negative / Urobili: Negative mg/dL   Blood: x / Protein: Negative mg/dL / Nitrite: Negative   Leuk Esterase: Trace / RBC: 0-2 /HPF / WBC 6-10   Sq Epi: x / Non Sq Epi: Few / Bacteria: Many      Cultures;   CAPILLARY BLOOD GLUCOSE        Lipid panel:     CARDIAC MARKERS ( 16 May 2018 12:57 )  <.015 ng/mL / x     / 82 U/L / x     / <0.5 ng/mL        RADIOLOGY & ADDITIONAL TESTS:    < from: Xray Kidney Ureter Bladder (18 @ 17:37) >  IMPRESSION:    Nonspecific intestinal gas pattern.      < from: US Duplex Venous Lower Ext Complete, Bilateral (18 @ 17:14) >    IMPRESSION:     Nonocclusive DVT at the junction of the left common femoral and femoral   veins.    No evidence of right lower extremity deep venous thrombosis.      < from: CT Chest No Cont (18 @ 14:34) >    IMPRESSION: Small left pleural effusion  Extensive bronchiectasis left lower lobe with some superimposed density   which may represent superimposed pneumonia  Significant increase in the size of the aortic arch aneurysm which   measures up to 7 cm. There is resultant volume loss in the left upper   lobe.    < from: CT Neck Soft Tissue No Cont (18 @ 14:34) >    IMPRESSION:      As seen on chest CT, there is a large aortic aneurysm, in the arch,   descending region. 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          < end of copied text >          Imaging Personally Reviewed:  [ x] YES      Consultant(s) Notes Reviewed:  [x ] YES     Care Discussed with [x ] Consultants [X ] Patient [x ] Family  [x ]    [x ]  Other; RN

## 2018-05-18 NOTE — PROGRESS NOTE ADULT - PROBLEM SELECTOR PLAN 2
reviewed HCP paper which also stated that she does want feeding tubes   explained that size and serious nature of the aneurysm is poor prognosis   they may be leaning towards hospice will get palliative eval   at present npo will use IV BP meds

## 2018-05-18 NOTE — PROGRESS NOTE ADULT - SUBJECTIVE AND OBJECTIVE BOX
Vital Signs Last 24 Hrs  T(C): 36.5 (18 May 2018 11:05), Max: 36.7 (17 May 2018 17:35)  T(F): 97.7 (18 May 2018 11:05), Max: 98 (17 May 2018 17:35)  HR: 82 (18 May 2018 11:05) (58 - 127)  BP: 146/99 (18 May 2018 11:05) (120/91 - 151/116)  BP(mean): --  RR: 20 (18 May 2018 11:05) (18 - 22)  SpO2: 97% (18 May 2018 11:05) (95% - 100%)  Physical Exam  patient lying in bed in no respiratory distress  HEENT - EOMI, PERRLA ,neck supple , No JVD  lungs - decreased BS, no wheezing , ronchi or rales   COR- [id number] , no murmer  Abd- soft, BS+, no tenderness, no guarding   ext- ecc neg, good pulses  Neuro - Alert , oriented X3   Skin- No rashes   MEDICATIONS  (STANDING):  ALBUTerol/ipratropium for Nebulization 3 milliLiter(s) Nebulizer every 6 hours  aspirin enteric coated 81 milliGRAM(s) Oral daily  dextrose 5% + sodium chloride 0.9%. 1000 milliLiter(s) (100 mL/Hr) IV Continuous <Continuous>  enoxaparin Injectable 70 milliGRAM(s) SubCutaneous every 12 hours  piperacillin/tazobactam IVPB. 3.375 Gram(s) IV Intermittent every 8 hours    MEDICATIONS  (PRN):  metoprolol tartrate Injectable 2.5 milliGRAM(s) IV Push every 8 hours PRN for sbp >150                        10.8   5.57  )-----------( 185      ( 18 May 2018 07:42 )             35.3   05-18    144  |  111<H>  |  9   ----------------------------<  107<H>  3.5   |  25  |  0.74    Ca    7.6<L>      18 May 2018 07:42  Phos  2.0     05-18  Mg     1.7     05-18      Urinalysis Basic - ( 16 May 2018 18:22 )    Color: Yellow / Appearance: Slightly Turbid / S.020 / pH: x  Gluc: x / Ketone: Negative  / Bili: Negative / Urobili: Negative mg/dL   Blood: x / Protein: Negative mg/dL / Nitrite: Negative   Leuk Esterase: Trace / RBC: 0-2 /HPF / WBC 6-10   Sq Epi: x / Non Sq Epi: Few / Bacteria: Many    procalcitonin< 0.05

## 2018-05-18 NOTE — SWALLOW VFSS/MBS ASSESSMENT ADULT - ORAL PHASE
Residue in oral cavity/Uncontrolled bolus / spillover in hypopharynx/Delayed oral transit time/oral holding-mod oral holding/Residue in oral cavity/Delayed oral transit time oral holding suspect compensatory/Uncontrolled bolus / spillover in hypopharynx/Delayed oral transit time Residue in oral cavity

## 2018-05-18 NOTE — SWALLOW VFSS/MBS ASSESSMENT ADULT - SLP GENERAL OBSERVATIONS
pt seen sitting in the chair alert and accepted po trials. pt essentially nonverbal for the test- she was able to follow simple directions, and communicated via gestures and facial expressions

## 2018-05-18 NOTE — SWALLOW VFSS/MBS ASSESSMENT ADULT - SLP PERTINENT HISTORY OF CURRENT PROBLEM
88f with extensive thoracic aneurysm and recent inability to talk failed outpatient antibiotics x2 for possible aspiration pneumonia with new deep vein thrombosis

## 2018-05-18 NOTE — SWALLOW VFSS/MBS ASSESSMENT ADULT - PHARYNGEAL PHASE COMMENTS
reduced Velopharyngeal closure causing nasal penetration/backflow backflow and nasal regurgitation into the nasopharynx

## 2018-05-18 NOTE — CONSULT NOTE ADULT - ATTENDING COMMENTS
Pt seen and examined.  Pt is not a surgical candidate for repair of aortic arch.  Would treat with blood pressure control.    Reconsult as needed.

## 2018-05-19 DIAGNOSIS — I10 ESSENTIAL (PRIMARY) HYPERTENSION: ICD-10-CM

## 2018-05-19 LAB — PROCALCITONIN SERPL-MCNC: <0.05 — SIGNIFICANT CHANGE UP (ref 0–0.04)

## 2018-05-19 PROCEDURE — 99497 ADVNCD CARE PLAN 30 MIN: CPT

## 2018-05-19 PROCEDURE — 99233 SBSQ HOSP IP/OBS HIGH 50: CPT

## 2018-05-19 PROCEDURE — 74176 CT ABD & PELVIS W/O CONTRAST: CPT | Mod: 26

## 2018-05-19 RX ORDER — POTASSIUM PHOSPHATE, MONOBASIC POTASSIUM PHOSPHATE, DIBASIC 236; 224 MG/ML; MG/ML
15 INJECTION, SOLUTION INTRAVENOUS ONCE
Qty: 0 | Refills: 0 | Status: COMPLETED | OUTPATIENT
Start: 2018-05-19 | End: 2018-05-19

## 2018-05-19 RX ORDER — MORPHINE SULFATE 50 MG/1
1 CAPSULE, EXTENDED RELEASE ORAL EVERY 8 HOURS
Qty: 0 | Refills: 0 | Status: DISCONTINUED | OUTPATIENT
Start: 2018-05-19 | End: 2018-05-20

## 2018-05-19 RX ADMIN — ENOXAPARIN SODIUM 70 MILLIGRAM(S): 100 INJECTION SUBCUTANEOUS at 17:59

## 2018-05-19 RX ADMIN — Medication 3 MILLILITER(S): at 00:35

## 2018-05-19 RX ADMIN — POTASSIUM PHOSPHATE, MONOBASIC POTASSIUM PHOSPHATE, DIBASIC 63.75 MILLIMOLE(S): 236; 224 INJECTION, SOLUTION INTRAVENOUS at 11:28

## 2018-05-19 RX ADMIN — PIPERACILLIN AND TAZOBACTAM 25 GRAM(S): 4; .5 INJECTION, POWDER, LYOPHILIZED, FOR SOLUTION INTRAVENOUS at 16:55

## 2018-05-19 RX ADMIN — Medication 1 PATCH: at 11:47

## 2018-05-19 RX ADMIN — MORPHINE SULFATE 1 MILLIGRAM(S): 50 CAPSULE, EXTENDED RELEASE ORAL at 11:28

## 2018-05-19 RX ADMIN — MORPHINE SULFATE 1 MILLIGRAM(S): 50 CAPSULE, EXTENDED RELEASE ORAL at 13:21

## 2018-05-19 RX ADMIN — Medication 3 MILLILITER(S): at 06:04

## 2018-05-19 RX ADMIN — Medication 3 MILLILITER(S): at 17:00

## 2018-05-19 RX ADMIN — PIPERACILLIN AND TAZOBACTAM 25 GRAM(S): 4; .5 INJECTION, POWDER, LYOPHILIZED, FOR SOLUTION INTRAVENOUS at 05:21

## 2018-05-19 RX ADMIN — ENOXAPARIN SODIUM 70 MILLIGRAM(S): 100 INJECTION SUBCUTANEOUS at 05:21

## 2018-05-19 RX ADMIN — PIPERACILLIN AND TAZOBACTAM 25 GRAM(S): 4; .5 INJECTION, POWDER, LYOPHILIZED, FOR SOLUTION INTRAVENOUS at 21:18

## 2018-05-19 RX ADMIN — Medication 3 MILLILITER(S): at 11:04

## 2018-05-19 NOTE — PROGRESS NOTE ADULT - SUBJECTIVE AND OBJECTIVE BOX
PALLIATIVE CARE CONSULTATION NOTE            Requested by Name: Dr. Butler  Date/ Time: 5/19/18  Reason for referral/ Consultation: Goals of care conversation.  HPI:  HPI Objective Statement: 86 yo female with pmh HTN, COPD, CAD, A-fib, sick sinus syndrome s/p PPM (battery changed 2017), PAD, abdominal and thoracic aortic aneurysm s/p EVAR, breast ca s/p lumpectomy/LN resection with RUE lymphedema presents with cough x 3-4 weeks and worsening in past few days also notes unable to speak x 2-3 weeks, + hoarse voice. also recently started on thickened liquids. Seen by pulm, given one abx, was told it was getting worse and gave another abx. At baseline, AOx3. per family, however also concerned regarding some dementia recently. Per daughter, aid states may have aspirated. cough is nonproductive.  + mild sob. no fever, sob, cp, palpitations. + constipation.   PAST MEDICAL & SURGICAL HISTORY:  Cellulitis: x2 2005  s/p fall 8-1010  seen [facility]  UTI (Lower Urinary Tract Infection): chronic pt on antibiotics, no current symptoms  Hiatal Hernia: s/p fundoplication 1964  OA (Osteoarthritis): b/l knee and right hip  Cardiac Pacemaker: medtronic placed 2010  AF (Atrial Fibrillation): diagnosed 2008  pt on sotalol and aspirin  Breast Ca: right s/p right lumpectomy axillary lymph node dissection 2000/ s/p chemo and radiation  current right lymphedema  Thoracic Aneurysm: pt had scan 8-2011  AAA (Abdominal Aortic Aneurysm): current diagnosis  Gout: last attack 15 years ago  COPD (Chronic Obstructive Pulmonary Disease)  Hypertension  H/O dilation and curettage: 5/2015  Femur fracture, right: s/p repair 1/2014  S/P Colonoscopy: 2005 normal  Cardiac Pacemaker:   medtronic  S/P Lumpectomy of Breast: right s/p axillary lymph node dissection/ current lymphadema  Hiatal Hernia: s/p fundoplication 1964  S/P Hernia Repair: right inguinal hernia repair 1956  S/P Appendectomy: 1947  S/P Tonsillectomy: childhood  Thoracic Aneurysm    SOCIAL HISTORY: Admitted from: Home [x ] SNF [ ] [facility] [ ] [facility] [ ]                                                                smoker [ ] past smoker [ ] non smoker[ ]                                                              no alcohol [ ] social alcohol [ ] alcohol [ ]  Surrogate/ HCP/ Guardian: [ ] YES [ ] NO.     NAME / PHONE #: Maria Guadalupe     Significant other/partner:                     Children:                         Adventism/Spirituality:    FAMILY HISTORY:  No pertinent family history in first degree relatives  Baseline ADLs (Prior to admission)  Independent:  Moderately [ ] fully [ ]   Dependent: moderately [ ] fully [x ]    ADVANCE DIRECTIVES:  [x ] YES [ ] NO   DNR: YES [x ] NO [  ]  Completed on:                     MOLST: YES [x ] NO [  ]  Completed on:  Living Will: YES [ x] NO [  ]  Completed on:    Allergies  No Known Drug Allergies  shellfish (Rash)  Intolerances    MEDICATIONS  (STANDING):  ALBUTerol/ipratropium for Nebulization 3 milliLiter(s) Nebulizer every 6 hours  aspirin enteric coated 81 milliGRAM(s) Oral daily  cloNIDine Patch 0.1 mG/24Hr(s) 1 patch Topical every 7 days  dextrose 5% + sodium chloride 0.9%. 1000 milliLiter(s) (100 mL/Hr) IV Continuous <Continuous>  enoxaparin Injectable 70 milliGRAM(s) SubCutaneous every 12 hours  piperacillin/tazobactam IVPB. 3.375 Gram(s) IV Intermittent every 8 hours    MEDICATIONS  (PRN):  metoprolol tartrate Injectable 2.5 milliGRAM(s) IV Push every 8 hours PRN for sbp >150  morphine  - Injectable 1 milliGRAM(s) IV Push every 8 hours PRN Severe Pain (7 - 10)    OPIATE NAIVE: (Y/N)  I STOP REVIEWED: (Y/N) : (#-------------------)     REVIEW OF SYSTEM:     PAIN : (Y/N) (0-10)  PAIN SITE :   generalized    QUALITY/QUANTITY OF PAIN :  dull           PAIN RADIATING :N            SEVERITY :            FREQUENCY : constant  IMPACT ON ADLs :   total care   DYSPNEA: Yes [ x ] No [  ] Mild [ ] Moderate [ ] Severe [x ]  NAUSEA/VOMITING: Yes [  ] No [x  ]  EXCESSIVE SECRETIONS: YES [ x ] NO [  ]  DEPRESSION: Yes [ x ] No [  ] Mild [ ]Moderate [ ] Severe [x ]  ANXIETY: Yes [ x ] No [  ]  AGITATION: Yes [  ] No [ x ]  CONSTIPATION : Yes [  ] No [x  ]  DIARRHEA : Yes [  ] No [x  ]  FRAILTY SYNDROME: Yes [ x ] No [  ]  FAILURE TO THRIVE: Yes [x  ] No [  ]  DEBILITY Yes [ x ] No [  ]  OTHER SYMPTOMS :  UNABLE TO OBTAIN DUE TO POOR MENTATION [  ]    PHYSICAL EXAM:  T(F): 97.4 (05-19-18 @ 11:15), Max: 98.8 (05-19-18 @ 06:15)  HR: 62 (05-19-18 @ 11:15) (62 - 96)  BP: 140/78 (05-19-18 @ 11:15) (128/89 - 152/103)  RR: 18 (05-19-18 @ 11:15) (17 - 20)  SpO2: 98% (05-19-18 @ 11:15) (95% - 99%)  Wt(kg): --   WEIGHT :                      BMI:  I&O's Summary    CAPILLARY BLOOD GLUCOSE  GENERAL: alert: Yes [x ] No [  ]oriented x _2_____confused [ ] lethargic [x ] agitated [ ] cachexia [x ] nonverbal [ ] coma [ ]  HEENT: normal [ ] dry mouth [ x] excessive secretions [ ] BiPAP [  ] ET tube/trach [ ] Vent [  ] O2 via N/C [x ]  LUNGS: Comfortable [ ] tachypnea/ labored breathing[ x]   CV :  normal [ ] tachycardia [x ]bradycardia [  ]   GI : normal [x ] distended [ ] tender [ ] no BS [ ]  PEG /NG tube (feeding/ Suction) [ ]dysphagea [x]   : normal [ ] incontinent [x ] oliguria/anuria [ ] Rodriguez [ ]  MSK : normal [ ] weakness [ ] edema [ ] ambulatory [ ] bedbound/ wheelchair bound [x ]   SKIN : normal [ ] pressure ulcer: Yes [ x ] No [  ] Stage ______________ rash: Yes [  ] No [  ]    FUNCTIONAL ASSESSMENT:   Karnofsky Performance Score : <20  Palliative Performance Status Version 2:         %    LABS:                        10.8   5.57  )-----------( 185      ( 18 May 2018 07:42 )             35.3     05-18    144  |  111<H>  |  9   ----------------------------<  107<H>  3.5   |  25  |  0.74    Ca    7.6<L>      18 May 2018 07:42  Phos  2.0     05-18  Mg     1.7     05-18  I&O's Detail    ASSESSMENT:   89y Female admitted with PNEUMONIA/COPD/THORACIC AORTIC ANEURYSM  SHORTNESS OF BREATH, COUGH    PROBLEM LIST :  PROBLEM/RECOMMENDATION:   1) PROBLEM  : Goals of care, counseling/ discussion.  RECOMMENDATION : Meet with patient/ family and discussed GOC & Advanced care planning.                                            Palliative care information /counseling provided.                                             Advanced Directives addressed     PROBLEM/ RECOMMENDATION:  2)PROBLEM :CODE STATUS (Resuscitation/ DNR/ DNI),   RECOMMENDATION: DNR/ DNI    PROBLEM/ RECOMMENDATION :  3)PROBLEM : Medical order for life sustaining treatment  RECOMMENDATION : MOLST Form completed on : 5/19/18    PROBLEM/RECOMMENDATION :  4)PROBLEM: Advanced care planning  RECOMMENDATION : Family meeting on: DATE: 5/19/18 Met and spoke to Hilario (daughters) and discussed goals of care and advance care planning. Educated on MOLST Form and completed, DNR/DNI, No tube feeding, Comfort measures only. Hospice care explained and accepted. Hospice  evaluation called in, and Social work made aware to send in all script for Hospice evaluation.     PLAN: Hospice evaluation pending for in patient Hospice care.    RECOMMENDATIONS:      DNR/ DNI, COMFORT CARE.                                          PAIN SYMPTOM MANAGEMENT WITH HOSPICE CARE.                                          HOSPICE CARE.  REFERRALS MADE FOR :      PALLIATIVE /MEDICAL SOCIAL WORKER AND : YES [ x ] NO [  ]                                            HOSPICE: YES [x  ] NO [  ]                                            : YES [ x ] NO [  ]                                            NUTRITION: YES [ x ] NO [  ]                                            SPEECH/ SWALLOW: YES [  ] NO [ x ]                                            PSYHCH CONSULT: YES [ ] NO [ x]                                             PAIN MANAGEMENT: YES [x  ] NO [  ]                                            PT/OT: YES [x  ] NO [  ]                                            ETHICS: YES [  ] NO [x  ]

## 2018-05-19 NOTE — PROGRESS NOTE ADULT - PROBLEM SELECTOR PLAN 2
as pt is npo    while in house will use prn iv bp meds will also try to  start low dose catapres patch

## 2018-05-19 NOTE — GOALS OF CARE CONVERSATION - PERSONAL ADVANCE DIRECTIVE - CONVERSATION DETAILS
86 yo female with pmh HTN, COPD, CAD, A-fib, sick sinus syndrome s/p PPM (battery changed 2017), PAD, abdominal and thoracic aortic aneurysm s/p EVAR, breast ca s/p lumpectomy/LN resection with RUE lymphedema presents with cough x 3-4 weeks and worsening in past few days also notes unable to speak x 2-3 weeks, + hoarse voice. also recently started on thickened liquids. Seen by pulm, given one abx, was told it was getting worse and gave another abx. At baseline, AOx3. per family, however also concerned regarding some dementia recently. Per daughter, aid states may have aspirated. cough is nonproductive.  + mild sob. no fever, sob, cp, palpitations. + constipation.     DATE: 5/19/18 Met and spoke to Maria Guadalupe and Paula (daughters) and discussed goals of care and advance care planning. Educated on MOLST Form and completed, DNR/DNI, No tube feeding, Comfort measures only. Hospice care explained and accepted. Hospice  evaluation called in, and Social work made aware to send in all script for Hospice evaluation.

## 2018-05-19 NOTE — PROGRESS NOTE ADULT - PROBLEM SELECTOR PLAN 3
reviewed HCP paper which also stated that she does want feeding tubes   explained that size and serious nature of the aneurysm is poor prognosis   obtained a ct surgery eval pe family requested who agreed not a surgical candidate.    placed a palliative care eval for hospice .   at present npo will use IV BP meds

## 2018-05-19 NOTE — GOALS OF CARE CONVERSATION - PERSONAL ADVANCE DIRECTIVE - NS PRO AD PATIENT TYPE
Medical Orders for Life-Sustaining Treatment (MOLST)/Do Not Resuscitate (DNR)/Health Care Proxy (HCP)/Living Will

## 2018-05-19 NOTE — PROGRESS NOTE ADULT - PROBLEM SELECTOR PLAN 6
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    s/p MBS and still NPO are recommendation and I agree    informed family and patient and pt declines feeding in the presence of her family .   and this is stated in her HCP in the chart

## 2018-05-19 NOTE — PROGRESS NOTE ADULT - SUBJECTIVE AND OBJECTIVE BOX
Patient is a 89y old  Female who presents with a chief complaint of     OVERNIGHT EVENTS: none       REVIEW OF SYSTEMS: denies chest pain/SOB, diaphoresis, no F/C, cough, dizziness, headache, blurry vision, nausea, vomiting, abdominal pain. Rest unremarkable     MEDICATIONS  (STANDING):  ALBUTerol/ipratropium for Nebulization 3 milliLiter(s) Nebulizer every 6 hours  aspirin enteric coated 81 milliGRAM(s) Oral daily  dextrose 5% + sodium chloride 0.9%. 1000 milliLiter(s) (100 mL/Hr) IV Continuous <Continuous>  enoxaparin Injectable 70 milliGRAM(s) SubCutaneous every 12 hours  piperacillin/tazobactam IVPB. 3.375 Gram(s) IV Intermittent every 8 hours    MEDICATIONS  (PRN):  metoprolol tartrate Injectable 2.5 milliGRAM(s) IV Push every 8 hours PRN for sbp >150    Allergies    No Known Drug Allergies  shellfish (Rash)    Intolerances        SUBJECTIVE: in bed in NAD, no acute events overnight     Vital Signs Last 24 Hrs  T(C): 37.1 (19 May 2018 06:15), Max: 37.1 (19 May 2018 06:15)  T(F): 98.8 (19 May 2018 06:15), Max: 98.8 (19 May 2018 06:15)  HR: 87 (19 May 2018 06:36) (58 - 88)  BP: 129/72 (19 May 2018 06:15) (128/89 - 152/103)  BP(mean): --  RR: 20 (19 May 2018 06:15) (17 - 20)  SpO2: 97% (19 May 2018 06:36) (95% - 99%)    PHYSICAL EXAM:  GENERAL: NAD, well-groomed, well-developed, elderly poor dentition     HEAD:  Atraumatic, Normocephalic  EYES: EOMI, PERRLA, conjunctiva and sclera clear  ENMT: No tonsillar erythema, exudates, or enlargement; Moist mucous membranes, poor dentition, No lesions, hypophonic  NECK: Supple, No JVD, Normal thyroid  CHEST/LUNG: decreased bs at base   HEART: Regular rate and rhythm; No murmurs, rubs, or gallops  ABDOMEN: Soft, Nontender, Nondistended; Bowel sounds present  EXTREMITIES:  2+ Peripheral Pulses, No clubbing, cyanosis, or edema BL LE  SKIN: No rashes or lesions  NERVOUS SYSTEM:  Alert & Oriented X3, Good concentration; Motor Strength 4/5 B/L upper and lower extremities;   DTRs 2+ intact and symmetric, sensation intact BL    LABS:                          10.8   5.57  )-----------( 185      ( 18 May 2018 07:42 )             35.3   05-18    144  |  111<H>  |  9   ----------------------------<  107<H>  3.5   |  25  |  0.74    Ca    7.6<L>      18 May 2018 07:42  Phos  2.0     05-18  Mg     1.7     05-18        Cultures;   CAPILLARY BLOOD GLUCOSE        Lipid panel:     CARDIAC MARKERS ( 16 May 2018 12:57 )  <.015 ng/mL / x     / 82 U/L / x     / <0.5 ng/mL        RADIOLOGY & ADDITIONAL TESTS:    < from: Xray Kidney Ureter Bladder (05.16.18 @ 17:37) >  IMPRESSION:    Nonspecific intestinal gas pattern.      < from: US Duplex Venous Lower Ext Complete, Bilateral (05.16.18 @ 17:14) >    IMPRESSION:     Nonocclusive DVT at the junction of the left common femoral and femoral   veins.    No evidence of right lower extremity deep venous thrombosis.      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 There is resultant volume loss in the left upper   lobe.    < from: CT Neck Soft Tissue No Cont (05.16.18 @ 14:34) >    IMPRESSION:      As seen on chest CT, there is a large aortic aneurysm, in the arch,   descending region. 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          < end of copied text >          Imaging Personally Reviewed:  [ x] YES      Consultant(s) Notes Reviewed:  [x ] YES     Care Discussed with [x ] Consultants [X ] Patient [x ] Family  [x ]    [x ]  Other; RN

## 2018-05-19 NOTE — GOALS OF CARE CONVERSATION - PERSONAL ADVANCE DIRECTIVE - WHAT MATTERS MOST
DATE: 5/19/18 Met and spoke to Hilario (daughters) and discussed goals of care and advance care planning. Educated on MOLST Form and completed, DNR/DNI, No tube feeding, Comfort measures only. Hospice care explained and accepted. Hospice  evaluation called in, and Social work made aware to send in all script for Hospice evaluation.

## 2018-05-20 DIAGNOSIS — R10.84 GENERALIZED ABDOMINAL PAIN: ICD-10-CM

## 2018-05-20 PROCEDURE — 99233 SBSQ HOSP IP/OBS HIGH 50: CPT

## 2018-05-20 RX ORDER — ACETAMINOPHEN 500 MG
650 TABLET ORAL EVERY 6 HOURS
Qty: 0 | Refills: 0 | Status: DISCONTINUED | OUTPATIENT
Start: 2018-05-20 | End: 2018-05-22

## 2018-05-20 RX ORDER — METOPROLOL TARTRATE 50 MG
2.5 TABLET ORAL ONCE
Qty: 0 | Refills: 0 | Status: COMPLETED | OUTPATIENT
Start: 2018-05-20 | End: 2018-05-20

## 2018-05-20 RX ORDER — ASPIRIN/CALCIUM CARB/MAGNESIUM 324 MG
81 TABLET ORAL DAILY
Qty: 0 | Refills: 0 | Status: DISCONTINUED | OUTPATIENT
Start: 2018-05-20 | End: 2018-05-20

## 2018-05-20 RX ORDER — ASPIRIN/CALCIUM CARB/MAGNESIUM 324 MG
81 TABLET ORAL DAILY
Qty: 0 | Refills: 0 | Status: DISCONTINUED | OUTPATIENT
Start: 2018-05-20 | End: 2018-05-22

## 2018-05-20 RX ORDER — MORPHINE SULFATE 50 MG/1
1 CAPSULE, EXTENDED RELEASE ORAL EVERY 12 HOURS
Qty: 0 | Refills: 0 | Status: DISCONTINUED | OUTPATIENT
Start: 2018-05-20 | End: 2018-05-22

## 2018-05-20 RX ADMIN — SODIUM CHLORIDE 100 MILLILITER(S): 9 INJECTION, SOLUTION INTRAVENOUS at 04:37

## 2018-05-20 RX ADMIN — Medication 3 MILLILITER(S): at 17:48

## 2018-05-20 RX ADMIN — Medication 3 MILLILITER(S): at 00:14

## 2018-05-20 RX ADMIN — MORPHINE SULFATE 1 MILLIGRAM(S): 50 CAPSULE, EXTENDED RELEASE ORAL at 13:47

## 2018-05-20 RX ADMIN — Medication 3 MILLILITER(S): at 11:19

## 2018-05-20 RX ADMIN — PIPERACILLIN AND TAZOBACTAM 25 GRAM(S): 4; .5 INJECTION, POWDER, LYOPHILIZED, FOR SOLUTION INTRAVENOUS at 21:55

## 2018-05-20 RX ADMIN — Medication 2.5 MILLIGRAM(S): at 00:50

## 2018-05-20 RX ADMIN — Medication 3 MILLILITER(S): at 05:45

## 2018-05-20 RX ADMIN — SODIUM CHLORIDE 100 MILLILITER(S): 9 INJECTION, SOLUTION INTRAVENOUS at 14:06

## 2018-05-20 RX ADMIN — Medication 650 MILLIGRAM(S): at 11:44

## 2018-05-20 RX ADMIN — MORPHINE SULFATE 1 MILLIGRAM(S): 50 CAPSULE, EXTENDED RELEASE ORAL at 14:05

## 2018-05-20 RX ADMIN — PIPERACILLIN AND TAZOBACTAM 25 GRAM(S): 4; .5 INJECTION, POWDER, LYOPHILIZED, FOR SOLUTION INTRAVENOUS at 04:35

## 2018-05-20 RX ADMIN — MORPHINE SULFATE 1 MILLIGRAM(S): 50 CAPSULE, EXTENDED RELEASE ORAL at 10:15

## 2018-05-20 RX ADMIN — ENOXAPARIN SODIUM 70 MILLIGRAM(S): 100 INJECTION SUBCUTANEOUS at 17:06

## 2018-05-20 RX ADMIN — ENOXAPARIN SODIUM 70 MILLIGRAM(S): 100 INJECTION SUBCUTANEOUS at 04:36

## 2018-05-20 RX ADMIN — PIPERACILLIN AND TAZOBACTAM 25 GRAM(S): 4; .5 INJECTION, POWDER, LYOPHILIZED, FOR SOLUTION INTRAVENOUS at 13:48

## 2018-05-20 RX ADMIN — MORPHINE SULFATE 1 MILLIGRAM(S): 50 CAPSULE, EXTENDED RELEASE ORAL at 10:01

## 2018-05-20 RX ADMIN — Medication 650 MILLIGRAM(S): at 12:44

## 2018-05-20 NOTE — PROGRESS NOTE ADULT - PROBLEM SELECTOR PLAN 4
etiology muoyifactorial ct acsan as above doen without contrsatt shows multiple aneurysm.  and ? gb disease will d w/ family if they want to persue further w/u of GB with US    they are very much aware of overall poor prognosis and aware of all the aneurysm, etiology multifactorial ct scan as above done without contrast shows multiple aneurysm.  and ? gb disease    I have d/w family in detail 3 of 4 daughters (one of which is HCP) and they decline further w/u   they want comfort car and a are hopeful she qualifies for inpt hospice and prefer if she can remain in the room she is in .       they are very much aware of overall poor prognosis and aware of all the aneurysm,

## 2018-05-20 NOTE — PROGRESS NOTE ADULT - PROBLEM SELECTOR PLAN 3
patient has several aortic aneurysms (aortic arch 7cm , AAA 4.8 cm, infrarenal 7.9 cm and left iliac artery  3.3cm)    reviewed HCP paper which also stated that she does want feeding tubes   explained that size and serious nature of the aneurysm is poor prognosis   obtained a ct surgery eval pe family requested who agreed not a surgical candidate.    placed a palliative care eval for hospice .   at present npo will use IV BP meds patient has several aortic aneurysms (aortic arch 7cm , AAA 4.8 cm, infrarenal 7.9 cm and left iliac artery  3.3cm)    reviewed HCP paper which also stated that she does want feeding tubes   explained that size and serious nature of the aneurysm is poor prognosis   obtained a ct surgery eval pe family requested who agreed not a surgical candidate.    placed a palliative care eval for hospice .   at present npo will use IV BP meds and catapre

## 2018-05-20 NOTE — PROGRESS NOTE ADULT - PROBLEM SELECTOR PLAN 7
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    -s/p MBS and still NPO are recommendation and I agree    -informed family and patient and pt declines feeding in the presence of her family .    and this is stated in her HCP in the chart

## 2018-05-20 NOTE — PROGRESS NOTE ADULT - SUBJECTIVE AND OBJECTIVE BOX
Patient is a 89y old  Female who presents with a chief complaint of     OVERNIGHT EVENTS: none       REVIEW OF SYSTEMS: denies chest pain/SOB, diaphoresis, no F/C, cough, dizziness, headache, blurry vision, nausea, vomiting, abdominal pain. Rest unremarkable     MEDICATIONS  (STANDING):  ALBUTerol/ipratropium for Nebulization 3 milliLiter(s) Nebulizer every 6 hours  aspirin enteric coated 81 milliGRAM(s) Oral daily  cloNIDine Patch 0.1 mG/24Hr(s) 1 patch Topical every 7 days  dextrose 5% + sodium chloride 0.9%. 1000 milliLiter(s) (100 mL/Hr) IV Continuous <Continuous>  enoxaparin Injectable 70 milliGRAM(s) SubCutaneous every 12 hours  piperacillin/tazobactam IVPB. 3.375 Gram(s) IV Intermittent every 8 hours    MEDICATIONS  (PRN):  metoprolol tartrate Injectable 2.5 milliGRAM(s) IV Push every 8 hours PRN for sbp >150  morphine  - Injectable 1 milliGRAM(s) IV Push every 8 hours PRN Severe Pain (7 - 10)    Allergies    No Known Drug Allergies  shellfish (Rash)    Intolerances        SUBJECTIVE: in bed in NAD, no acute events overnight     Vital Signs Last 24 Hrs  T(C): 36.3 (20 May 2018 05:34), Max: 36.6 (19 May 2018 17:06)  T(F): 97.4 (20 May 2018 05:34), Max: 97.8 (19 May 2018 17:06)  HR: 114 (20 May 2018 06:04) (62 - 125)  BP: 120/89 (20 May 2018 05:34) (118/84 - 140/78)  BP(mean): --  RR: 18 (20 May 2018 05:34) (18 - 18)  SpO2: 95% (20 May 2018 06:04) (94% - 99%)    PHYSICAL EXAM:  GENERAL: NAD, well-groomed, well-developed, elderly poor dentition     HEAD:  Atraumatic, Normocephalic  EYES: EOMI, PERRLA, conjunctiva and sclera clear  ENMT: No tonsillar erythema, exudates, or enlargement; Moist mucous membranes, poor dentition, No lesions, hypophonic  NECK: Supple, No JVD, Normal thyroid  CHEST/LUNG: decreased bs at base   HEART: Regular rate and rhythm; No murmurs, rubs, or gallops  ABDOMEN: Soft, generalized non specific tenderness,  no rebound . nondistended; Bowel sounds present  EXTREMITIES:  2+ Peripheral Pulses, No clubbing, cyanosis, or edema BL LE  SKIN: No rashes or lesions  NERVOUS SYSTEM:  sleepy  fair concentration; Motor Strength  not assessed   LABS:             no new labs     Cultures;   CAPILLARY BLOOD GLUCOSE        Lipid panel:     CARDIAC MARKERS ( 16 May 2018 12:57 )  <.015 ng/mL / x     / 82 U/L / x     / <0.5 ng/mL        RADIOLOGY & ADDITIONAL TESTS:    < from: Xray Kidney Ureter Bladder (05.16.18 @ 17:37) >  IMPRESSION:    Nonspecific intestinal gas pattern.      < from: US Duplex Venous Lower Ext Complete, Bilateral (05.16.18 @ 17:14) >    IMPRESSION:     Nonocclusive DVT at the junction of the left common femoral and femoral   veins.    No evidence of right lower extremity deep venous thrombosis.      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 There is resultant volume loss in the left upper   lobe.    < from: CT Neck Soft Tissue No Cont (05.16.18 @ 14:34) >    IMPRESSION:      As seen on chest CT, there is a large aortic aneurysm, in the arch,   descending region. 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          < from: CT Abdomen and Pelvis No Cont (05.19.18 @ 16:26) >    IMPRESSION: Distended gallbladder containingsludge. Mild pericholecystic   fat stranding. Correlate with ultrasound for acute cholecystitis if   clinically indicated.    Aneurysmal abdominal aorta and partially visualized aneurysmal thoracic   aorta. The excluded infrarenal abdominal aortic aneurysm measures 7.9 cm,   increased in diameter compared to 2016. Please note that evaluation for   endoleak is markedly limited without intravenous contrast.    Patchy opacities in the left lower lobe may be infectious in etiology.    Additional findings as above.          < end of copied text >          Imaging Personally Reviewed:  [ x] YES      Consultant(s) Notes Reviewed:  [x ] YES     Care Discussed with [x ] Consultants [X ] Patient [x ] Family  [x ]    [x ]  Other; RN Patient is a 89y old  Female who presents with a chief complaint of     OVERNIGHT EVENTS: none       REVIEW OF SYSTEMS: denies chest pain/SOB, diaphoresis, no F/C, cough, dizziness, headache, blurry vision, nausea, vomiting, abdominal pain. Rest unremarkable     MEDICATIONS  (STANDING):  ALBUTerol/ipratropium for Nebulization 3 milliLiter(s) Nebulizer every 6 hours  aspirin enteric coated 81 milliGRAM(s) Oral daily  cloNIDine Patch 0.1 mG/24Hr(s) 1 patch Topical every 7 days  dextrose 5% + sodium chloride 0.9%. 1000 milliLiter(s) (100 mL/Hr) IV Continuous <Continuous>  enoxaparin Injectable 70 milliGRAM(s) SubCutaneous every 12 hours  piperacillin/tazobactam IVPB. 3.375 Gram(s) IV Intermittent every 8 hours    MEDICATIONS  (PRN):  metoprolol tartrate Injectable 2.5 milliGRAM(s) IV Push every 8 hours PRN for sbp >150  morphine  - Injectable 1 milliGRAM(s) IV Push every 8 hours PRN Severe Pain (7 - 10)    Allergies    No Known Drug Allergies  shellfish (Rash)    Intolerances        SUBJECTIVE: in bed in NAD, no acute events overnight     Vital Signs Last 24 Hrs  T(C): 36.3 (20 May 2018 05:34), Max: 36.6 (19 May 2018 17:06)  T(F): 97.4 (20 May 2018 05:34), Max: 97.8 (19 May 2018 17:06)  HR: 114 (20 May 2018 06:04) (62 - 125)  BP: 120/89 (20 May 2018 05:34) (118/84 - 140/78)  BP(mean): --  RR: 18 (20 May 2018 05:34) (18 - 18)  SpO2: 95% (20 May 2018 06:04) (94% - 99%)    PHYSICAL EXAM:  GENERAL: NAD, well-groomed, well-developed, elderly poor dentition     HEAD:  Atraumatic, Normocephalic  EYES: EOMI, PERRLA, conjunctiva and sclera clear  ENMT: No tonsillar erythema, exudates, or enlargement; Moist mucous membranes, poor dentition, No lesions, hypophonic  NECK: Supple, No JVD, Normal thyroid  CHEST/LUNG: decreased bs at base   HEART: Regular rate and rhythm; No murmurs, rubs, or gallops  ABDOMEN: Soft, more ruq pain tenderness,  no rebound . nondistended; Bowel sounds present  EXTREMITIES:  2+ Peripheral Pulses, No clubbing, cyanosis, or edema BL LE  SKIN: No rashes or lesions  NERVOUS SYSTEM:  sleepy  fair concentration; Motor Strength  not assessed   LABS:             no new labs     Cultures;   CAPILLARY BLOOD GLUCOSE        Lipid panel:     CARDIAC MARKERS ( 16 May 2018 12:57 )  <.015 ng/mL / x     / 82 U/L / x     / <0.5 ng/mL        RADIOLOGY & ADDITIONAL TESTS:    < from: Xray Kidney Ureter Bladder (05.16.18 @ 17:37) >  IMPRESSION:    Nonspecific intestinal gas pattern.      < from: US Duplex Venous Lower Ext Complete, Bilateral (05.16.18 @ 17:14) >    IMPRESSION:     Nonocclusive DVT at the junction of the left common femoral and femoral   veins.    No evidence of right lower extremity deep venous thrombosis.      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 There is resultant volume loss in the left upper   lobe.    < from: CT Neck Soft Tissue No Cont (05.16.18 @ 14:34) >    IMPRESSION:      As seen on chest CT, there is a large aortic aneurysm, in the arch,   descending region. 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          < from: CT Abdomen and Pelvis No Cont (05.19.18 @ 16:26) >    IMPRESSION: Distended gallbladder containingsludge. Mild pericholecystic   fat stranding. Correlate with ultrasound for acute cholecystitis if   clinically indicated.    Aneurysmal abdominal aorta and partially visualized aneurysmal thoracic   aorta. The excluded infrarenal abdominal aortic aneurysm measures 7.9 cm,   increased in diameter compared to 2016. Please note that evaluation for   endoleak is markedly limited without intravenous contrast.    Patchy opacities in the left lower lobe may be infectious in etiology.    Additional findings as above.          < end of copied text >          Imaging Personally Reviewed:  [ x] YES      Consultant(s) Notes Reviewed:  [x ] YES     Care Discussed with [x ] Consultants [X ] Patient [x ] Family  [x ]    [x ]  Other; RN

## 2018-05-21 LAB
CULTURE RESULTS: SIGNIFICANT CHANGE UP
CULTURE RESULTS: SIGNIFICANT CHANGE UP
SPECIMEN SOURCE: SIGNIFICANT CHANGE UP
SPECIMEN SOURCE: SIGNIFICANT CHANGE UP

## 2018-05-21 PROCEDURE — 76937 US GUIDE VASCULAR ACCESS: CPT | Mod: 26

## 2018-05-21 PROCEDURE — 36569 INSJ PICC 5 YR+ W/O IMAGING: CPT

## 2018-05-21 PROCEDURE — 99497 ADVNCD CARE PLAN 30 MIN: CPT

## 2018-05-21 PROCEDURE — 99232 SBSQ HOSP IP/OBS MODERATE 35: CPT

## 2018-05-21 RX ORDER — CHLORHEXIDINE GLUCONATE 213 G/1000ML
1 SOLUTION TOPICAL DAILY
Qty: 0 | Refills: 0 | Status: DISCONTINUED | OUTPATIENT
Start: 2018-05-21 | End: 2018-05-22

## 2018-05-21 RX ORDER — MORPHINE SULFATE 50 MG/1
1 CAPSULE, EXTENDED RELEASE ORAL ONCE
Qty: 0 | Refills: 0 | Status: DISCONTINUED | OUTPATIENT
Start: 2018-05-21 | End: 2018-05-21

## 2018-05-21 RX ORDER — PETROLATUM,WHITE
1 JELLY (GRAM) TOPICAL THREE TIMES A DAY
Qty: 0 | Refills: 0 | Status: DISCONTINUED | OUTPATIENT
Start: 2018-05-21 | End: 2018-05-22

## 2018-05-21 RX ADMIN — Medication 3 MILLILITER(S): at 05:33

## 2018-05-21 RX ADMIN — MORPHINE SULFATE 1 MILLIGRAM(S): 50 CAPSULE, EXTENDED RELEASE ORAL at 19:02

## 2018-05-21 RX ADMIN — Medication 3 MILLILITER(S): at 17:09

## 2018-05-21 RX ADMIN — Medication 1 APPLICATION(S): at 06:33

## 2018-05-21 RX ADMIN — SODIUM CHLORIDE 100 MILLILITER(S): 9 INJECTION, SOLUTION INTRAVENOUS at 22:21

## 2018-05-21 RX ADMIN — Medication 650 MILLIGRAM(S): at 12:18

## 2018-05-21 RX ADMIN — Medication 3 MILLILITER(S): at 00:24

## 2018-05-21 RX ADMIN — Medication 3 MILLILITER(S): at 11:17

## 2018-05-21 RX ADMIN — Medication 81 MILLIGRAM(S): at 12:13

## 2018-05-21 RX ADMIN — ENOXAPARIN SODIUM 70 MILLIGRAM(S): 100 INJECTION SUBCUTANEOUS at 17:30

## 2018-05-21 RX ADMIN — MORPHINE SULFATE 1 MILLIGRAM(S): 50 CAPSULE, EXTENDED RELEASE ORAL at 07:15

## 2018-05-21 RX ADMIN — Medication 1 APPLICATION(S): at 22:21

## 2018-05-21 RX ADMIN — MORPHINE SULFATE 1 MILLIGRAM(S): 50 CAPSULE, EXTENDED RELEASE ORAL at 06:47

## 2018-05-21 RX ADMIN — Medication 650 MILLIGRAM(S): at 12:45

## 2018-05-21 RX ADMIN — Medication 1 APPLICATION(S): at 14:06

## 2018-05-21 RX ADMIN — PIPERACILLIN AND TAZOBACTAM 25 GRAM(S): 4; .5 INJECTION, POWDER, LYOPHILIZED, FOR SOLUTION INTRAVENOUS at 22:21

## 2018-05-21 RX ADMIN — ENOXAPARIN SODIUM 70 MILLIGRAM(S): 100 INJECTION SUBCUTANEOUS at 06:32

## 2018-05-21 NOTE — PROCEDURE NOTE - PROCEDURE
<<-----Click on this checkbox to enter Procedure Midline catheter insertion  05/21/2018    Active  [id number]

## 2018-05-21 NOTE — PROGRESS NOTE ADULT - SUBJECTIVE AND OBJECTIVE BOX
Patient is a 89y old  Female who presents with a chief complaint of     INTERVAL HPI/OVERNIGHT EVENTS:  pt  seen  at  bed  side   MEDICATIONS  (STANDING):  ALBUTerol/ipratropium for Nebulization 3 milliLiter(s) Nebulizer every 6 hours  aspirin  chewable 81 milliGRAM(s) Oral daily  cloNIDine Patch 0.1 mG/24Hr(s) 1 patch Topical every 7 days  dextrose 5% + sodium chloride 0.9%. 1000 milliLiter(s) (100 mL/Hr) IV Continuous <Continuous>  enoxaparin Injectable 70 milliGRAM(s) SubCutaneous every 12 hours  morphine  - Injectable 1 milliGRAM(s) IV Push every 12 hours  petrolatum white Ointment 1 Application(s) Topical three times a day  piperacillin/tazobactam IVPB. 3.375 Gram(s) IV Intermittent every 8 hours    MEDICATIONS  (PRN):  acetaminophen  Suppository. 650 milliGRAM(s) Rectal every 6 hours PRN Mild Pain (1 - 3)  metoprolol tartrate Injectable 2.5 milliGRAM(s) IV Push every 8 hours PRN for sbp >150      Allergies    No Known Drug Allergies  shellfish (Rash)    Intolerances          Vital Signs Last 24 Hrs  T(C): 36.1 (21 May 2018 05:33), Max: 36.7 (20 May 2018 10:01)  T(F): 96.9 (21 May 2018 05:33), Max: 98 (20 May 2018 10:01)  HR: 64 (21 May 2018 06:04) (60 - 109)  BP: 137/105 (21 May 2018 05:33) (96/71 - 139/95)  BP(mean): --  RR: 18 (21 May 2018 05:33) (16 - 20)  SpO2: 98% (21 May 2018 06:04) (93% - 99%)    PHYSICAL EXAM:  pt   is  alert  & awake   Heart - S1 , S2  + R/R/R  Lung- B/L  air   entry  , slight  decreased breath  sound B/L Lung  at base area  ABD-  Obese , slight tenderness  at RT UQ  area , soft , ND, BS+  EXT- No edema   LABS:              CAPILLARY BLOOD GLUCOSE        Lipid panel:           TSH     RADIOLOGY & ADDITIONAL TESTS:    Imaging Personally Reviewed:  [ ] YES  [ ] NO    Consultant(s) Notes Reviewed:  [ ] YES  [ ] NO    Care Discussed with Consultants/Other Providers [ ] YES  [ ] NO

## 2018-05-21 NOTE — PROGRESS NOTE ADULT - PROBLEM SELECTOR PLAN 7
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    -s/p MBS and still NPO are recommendation

## 2018-05-21 NOTE — PROGRESS NOTE ADULT - PROBLEM SELECTOR PLAN 4
etiology multifactorial ct scan as above done without contrast shows multiple aneurysm.  and ? gb disease    comfort care and  inpt hospice and prefer if she can remain in the room she is in .        overall poor prognosis and aware of all the aneurysm,

## 2018-05-21 NOTE — PROGRESS NOTE ADULT - PROBLEM SELECTOR PLAN 2
as pt is npo    while in house will use prn iv bp meds    and started a  low dose catapres patch  Continue  Metoprolol & Clonidine

## 2018-05-21 NOTE — PROCEDURE NOTE - ADDITIONAL PROCEDURE DETAILS
Patient seen at bedside for midline catheter placement.  Consent obtained from patient's daughter Maria Guadalupe after risks/benefits/alternatives explained.   Upper extremity prepped and draped in usual sterile fashion. Time out performed with RN. 4Fr 15 cm single lumen midline catheter placed using ultrasound guided seldinger technique, L basilic vein. Flushes well, with good venous return. Patient tolerated procedure well without complication and was left in no acute distress. Upper arm circumference noted to be 30cm.

## 2018-05-21 NOTE — PROCEDURE NOTE - NSPROCDETAILS_GEN_ALL_CORE
sterile dressing applied/supine position/ultrasound assessment/location identified, draped/prepped, sterile technique used/ultrasound guidance/sterile technique, catheter placed

## 2018-05-21 NOTE — PROGRESS NOTE ADULT - PROBLEM SELECTOR PLAN 3
patient has several aortic aneurysms (aortic arch 7cm , AAA 4.8 cm, infrarenal 7.9 cm and left iliac artery  3.3cm)    reviewed HCP paper which also stated that she does want feeding tubes   explained that size and serious nature of the aneurysm is poor prognosis   obtained a ct surgery eval pe family requested who agreed not a surgical candidate.    placed a palliative care eval for hospice .   at present npo will use IV BP meds and catapre patient has several aortic aneurysms (aortic arch 7cm , AAA 4.8 cm, infrarenal 7.9 cm and left iliac artery  3.3cm)        size and serious nature of the aneurysm is poor prognosis   obtained a ct surgery eval pe family requested who agreed not a surgical candidate.    placed a palliative care eval for hospice .   at present npo will use IV BP meds and catapre

## 2018-05-21 NOTE — PROGRESS NOTE ADULT - SUBJECTIVE AND OBJECTIVE BOX
PALLIATIVE CARE NP NOTE            Date/ Time: 5/21/18  HPI:  HPI Objective Statement: 88 yo female with pmh HTN, COPD, CAD, A-fib, sick sinus syndrome s/p PPM (battery changed 2017), PAD, abdominal and thoracic aortic aneurysm s/p EVAR, breast ca s/p lumpectomy/LN resection with RUE lymphedema presents with cough x 3-4 weeks and worsening in past few days also notes unable to speak x 2-3 weeks, + hoarse voice. also recently started on thickened liquids. Seen by pulm, given one abx, was told it was getting worse and gave another abx. At baseline, AOx3. per family, however also concerned regarding some dementia recently. Per daughter, aid states may have aspirated. cough is nonproductive.  + mild sob. no fever, sob, cp, palpitations. + constipation.   PAST MEDICAL & SURGICAL HISTORY:  Cellulitis: x2 2005  s/p fall 8-1010  seen [facility]  UTI (Lower Urinary Tract Infection): chronic pt on antibiotics, no current symptoms  Hiatal Hernia: s/p fundoplication 1964  OA (Osteoarthritis): b/l knee and right hip  Cardiac Pacemaker: medtronic placed 2010  AF (Atrial Fibrillation): diagnosed 2008  pt on sotalol and aspirin  Breast Ca: right s/p right lumpectomy axillary lymph node dissection 2000/ s/p chemo and radiation  current right lymphedema  Thoracic Aneurysm: pt had scan 8-2011  AAA (Abdominal Aortic Aneurysm): current diagnosis  Gout: last attack 15 years ago  COPD (Chronic Obstructive Pulmonary Disease)  Hypertension  H/O dilation and curettage: 5/2015  Femur fracture, right: s/p repair 1/2014  S/P Colonoscopy: 2005 normal  Cardiac Pacemaker:   medtronic  S/P Lumpectomy of Breast: right s/p axillary lymph node dissection/ current lymphadema  Hiatal Hernia: s/p fundoplication 1964  S/P Hernia Repair: right inguinal hernia repair 1956  S/P Appendectomy: 1947  S/P Tonsillectomy: childhood  Thoracic Aneurysm      SOCIAL HISTORY: Admitted from: Home [x ] SNF [ ] [facility] [ ] [facility] [ ]                                                                smoker [ ] past smoker [ ] non smoker[ ]                                                              no alcohol [ ] social alcohol [ ] alcohol [ ]  Surrogate/ HCP/ Guardian: [ ] YES [ ] NO.     NAME / PHONE #:    Significant other/partner:                     Children:                         Bahai/Spirituality:    FAMILY HISTORY:  No pertinent family history in first degree relatives      Baseline ADLs (Prior to admission)  Independent:  Moderately [ ] fully [ ]   Dependent: moderately [x ] fully [ ]    ADVANCE DIRECTIVES:  [ ] YES [ ] NO   DNR: YES [x ] NO [  ]  Completed on:                     MOLST: YES [x ] NO [  ]  Completed on: 5/19/18  Living Will: YES [ ] NO [  ]  Completed on:    Allergies  No Known Drug Allergies  shellfish (Rash)  Intolerances    MEDICATIONS  (STANDING):  ALBUTerol/ipratropium for Nebulization 3 milliLiter(s) Nebulizer every 6 hours  aspirin  chewable 81 milliGRAM(s) Oral daily  cloNIDine Patch 0.1 mG/24Hr(s) 1 patch Topical every 7 days  dextrose 5% + sodium chloride 0.9%. 1000 milliLiter(s) (100 mL/Hr) IV Continuous <Continuous>  enoxaparin Injectable 70 milliGRAM(s) SubCutaneous every 12 hours  morphine  - Injectable 1 milliGRAM(s) IV Push every 12 hours  petrolatum white Ointment 1 Application(s) Topical three times a day  piperacillin/tazobactam IVPB. 3.375 Gram(s) IV Intermittent every 8 hours    MEDICATIONS  (PRN):  acetaminophen  Suppository. 650 milliGRAM(s) Rectal every 6 hours PRN Mild Pain (1 - 3)  metoprolol tartrate Injectable 2.5 milliGRAM(s) IV Push every 8 hours PRN for sbp >150    OPIATE NAIVE: (Y/N)  I STOP REVIEWED: (Y/N) : (#-------------------)     REVIEW OF SYSTEM:     PAIN : (Y/N) (0-10)  PAIN SITE :   generalized   QUALITY/QUANTITY OF PAIN :             PAIN RADIATING :( Y/N)            SEVERITY :            FREQUENCY :  IMPACT ON ADLs :    total care  DYSPNEA: Yes [ x ] No [  ] Mild [ ] Moderate [x ] Severe [ ]  NAUSEA/VOMITING: Yes [  ] No [ x ]  EXCESSIVE SECRETIONS: YES [  ] NO [x  ]  DEPRESSION: Yes [ x ] No [  ] Mild [ ]Moderate [x ] Severe [ ]  ANXIETY: Yes [ x ] No [  ]  AGITATION: Yes [  ] No [ x ]  CONSTIPATION : Yes [  ] No [x  ]  DIARRHEA : Yes [  ] No [ x ]  FRAILTY SYNDROME: Yes [ x ] No [  ]  FAILURE TO THRIVE: Yes [ x ] No [  ]  DIBILITY: Yes [ x ] No [  ]  OTHER SYMPTOMS :  UNABLE TO OBTAIN DUE TO POOR MENTATION [  ]    PHYSICAL EXAM:  T(F): 98 (05-21-18 @ 11:31), Max: 98 (05-21-18 @ 11:31)  HR: 63 (05-21-18 @ 17:58) (62 - 80)  BP: 122/84 (05-21-18 @ 11:31) (122/84 - 139/95)  RR: 16 (05-21-18 @ 11:31) (16 - 18)  SpO2: 99% (05-21-18 @ 17:58) (95% - 99%)  Wt(kg): --   WEIGHT :                      BMI:  I&O's Summary    20 May 2018 07:01  -  21 May 2018 07:00  --------------------------------------------------------  IN: 1250 mL / OUT: 0 mL / NET: 1250 mL    CAPILLARY BLOOD GLUCOSE  GENERAL: alert: Yes [ x] No [  ]oriented x ______confused [ ] lethargic [x ] agitated [ ] cachexia [ ] nonverbal [ ] coma [ ]  HEENT: normal [ ] dry mouth [x ] excessive secretions [ ] BiPAP [  ] ET tube/trach [ ] Vent [  ] O2 via N/C [x ]  LUNGS: Comfortable [x ] tachypnea/ labored breathing[ ]   CV :  normal [ ] tachycardia [ ]bradycardia [ x ]   GI : normal [x ] distended [ ] tender [ ] no BS [ ]  PEG /NG tube (feeding/ Suction) [ ]   : normal [ ] incontinent [x ] oliguria/anuria [ ] Rodriguez [ ]  MSK : normal [ ] weakness [ ] edema [ ] ambulatory [ ] bedbound/ wheelchair bound [x ]   SKIN : normal [ ] pressure ulcer: Yes [  ] No [  ] Stage ______________ rash: Yes [  ] No [  ]    FUNCTIONAL ASSESSMENT:   Karnofsky Performance Score : <20  Palliative Performance Status Version 2:         %  LABS:  I&O's Detail    20 May 2018 07:01  -  21 May 2018 07:00  --------------------------------------------------------  IN:    dextrose 5% + sodium chloride 0.9%.: 1200 mL    Solution: 50 mL  Total IN: 1250 mL    OUT:  Total OUT: 0 mL    Total NET: 1250 mL  ASSESSMENT:   89y Female admitted with PNEUMONIA/COPD/THORACIC AORTIC ANEURYSM  SHORTNESS OF BREATH, COUGH    PROBLEM LIST :  PROBLEM/RECOMMENDATION:   1) PROBLEM  : Goals of care, counseling/ discussion.  RECOMMENDATION : Meet with patient/ family and discussed GOC & Advanced care planning.                                            Palliative care information /counseling provided.                                             Advanced Directives addressed     PROBLEM/ RECOMMENDATION:  2)PROBLEM :CODE STATUS (Resuscitation/ DNR/ DNI),   RECOMMENDATION: DNR/ DNI    PROBLEM/ RECOMMENDATION :  3)PROBLEM : Medical order for life sustaining treatment  RECOMMENDATION : MOLST Form completed on : 5/19/18    PROBLEM/RECOMMENDATION :  4)PROBLEM: Advanced care planning  RECOMMENDATION : Family meeting on: DATE: 5/21/18 Met patient and spoke to daughters awaiting for midline placement for IV access and Hospice evaluation.                                            PLAN: Hospice evaluation pending.    RECOMMENDATIONS:  DNR/ DNI, COMFORT CARE.                                          PAIN and SYMPTOM MANAGEMENT WITH HOSPICE CARE.                                          HOSPICE CARE.

## 2018-05-21 NOTE — PROCEDURE NOTE - NSPOSTCAREGUIDE_GEN_A_CORE
Verbal/written post procedure instructions were given to patient/caregiver/Instructed patient/caregiver regarding signs and symptoms of infection/Care for catheter as per unit/ICU protocols/Instructed patient/caregiver to follow-up with primary care physician/Keep the cast/splint/dressing clean and dry

## 2018-05-22 ENCOUNTER — INPATIENT (INPATIENT)
Facility: HOSPITAL | Age: 83
LOS: 0 days | Discharge: HOME | End: 2018-05-23
Attending: INTERNAL MEDICINE | Admitting: INTERNAL MEDICINE

## 2018-05-22 VITALS
RESPIRATION RATE: 18 BRPM | HEART RATE: 59 BPM | OXYGEN SATURATION: 100 % | SYSTOLIC BLOOD PRESSURE: 130 MMHG | DIASTOLIC BLOOD PRESSURE: 95 MMHG | TEMPERATURE: 97 F | WEIGHT: 153.88 LBS

## 2018-05-22 VITALS — OXYGEN SATURATION: 99 %

## 2018-05-22 DIAGNOSIS — B37.81 CANDIDAL ESOPHAGITIS: ICD-10-CM

## 2018-05-22 DIAGNOSIS — R13.10 DYSPHAGIA, UNSPECIFIED: ICD-10-CM

## 2018-05-22 DIAGNOSIS — Z98.89 OTHER SPECIFIED POSTPROCEDURAL STATES: Chronic | ICD-10-CM

## 2018-05-22 DIAGNOSIS — S72.91XA UNSPECIFIED FRACTURE OF RIGHT FEMUR, INITIAL ENCOUNTER FOR CLOSED FRACTURE: Chronic | ICD-10-CM

## 2018-05-22 DIAGNOSIS — I10 ESSENTIAL (PRIMARY) HYPERTENSION: ICD-10-CM

## 2018-05-22 PROCEDURE — 99239 HOSP IP/OBS DSCHRG MGMT >30: CPT

## 2018-05-22 RX ORDER — SODIUM CHLORIDE 9 MG/ML
1000 INJECTION, SOLUTION INTRAVENOUS
Qty: 0 | Refills: 0 | Status: DISCONTINUED | OUTPATIENT
Start: 2018-05-22 | End: 2018-05-23

## 2018-05-22 RX ORDER — IPRATROPIUM/ALBUTEROL SULFATE 18-103MCG
3 AEROSOL WITH ADAPTER (GRAM) INHALATION EVERY 6 HOURS
Qty: 0 | Refills: 0 | Status: DISCONTINUED | OUTPATIENT
Start: 2018-05-22 | End: 2018-05-23

## 2018-05-22 RX ORDER — ENOXAPARIN SODIUM 100 MG/ML
0 INJECTION SUBCUTANEOUS
Qty: 0 | Refills: 0 | COMMUNITY
Start: 2018-05-22

## 2018-05-22 RX ORDER — DOCUSATE SODIUM 100 MG
1 CAPSULE ORAL
Qty: 0 | Refills: 0 | COMMUNITY

## 2018-05-22 RX ORDER — OMEPRAZOLE 10 MG/1
1 CAPSULE, DELAYED RELEASE ORAL
Qty: 0 | Refills: 0 | COMMUNITY

## 2018-05-22 RX ORDER — PETROLATUM,WHITE
1 JELLY (GRAM) TOPICAL
Qty: 0 | Refills: 0 | COMMUNITY
Start: 2018-05-22

## 2018-05-22 RX ORDER — METOPROLOL TARTRATE 50 MG
0 TABLET ORAL
Qty: 0 | Refills: 0 | COMMUNITY
Start: 2018-05-22

## 2018-05-22 RX ORDER — FLUCONAZOLE 150 MG/1
100 TABLET ORAL EVERY 24 HOURS
Qty: 0 | Refills: 0 | Status: DISCONTINUED | OUTPATIENT
Start: 2018-05-23 | End: 2018-05-23

## 2018-05-22 RX ORDER — NYSTATIN 500MM UNIT
500000 POWDER (EA) MISCELLANEOUS
Qty: 0 | Refills: 0 | Status: DISCONTINUED | OUTPATIENT
Start: 2018-05-22 | End: 2018-05-23

## 2018-05-22 RX ORDER — ALENDRONATE SODIUM 70 MG/1
1 TABLET ORAL
Qty: 0 | Refills: 0 | COMMUNITY

## 2018-05-22 RX ORDER — FLUCONAZOLE 150 MG/1
TABLET ORAL
Qty: 0 | Refills: 0 | Status: DISCONTINUED | OUTPATIENT
Start: 2018-05-22 | End: 2018-05-22

## 2018-05-22 RX ORDER — CHLORHEXIDINE GLUCONATE 213 G/1000ML
1 SOLUTION TOPICAL
Qty: 0 | Refills: 0 | COMMUNITY
Start: 2018-05-22

## 2018-05-22 RX ORDER — ENOXAPARIN SODIUM 100 MG/ML
70 INJECTION SUBCUTANEOUS EVERY 12 HOURS
Qty: 0 | Refills: 0 | Status: DISCONTINUED | OUTPATIENT
Start: 2018-05-22 | End: 2018-05-23

## 2018-05-22 RX ORDER — MULTIVIT-MIN/FERROUS GLUCONATE 9 MG/15 ML
1 LIQUID (ML) ORAL
Qty: 0 | Refills: 0 | COMMUNITY

## 2018-05-22 RX ORDER — FLUCONAZOLE 150 MG/1
200 TABLET ORAL ONCE
Qty: 0 | Refills: 0 | Status: COMPLETED | OUTPATIENT
Start: 2018-05-22 | End: 2018-05-22

## 2018-05-22 RX ORDER — MORPHINE SULFATE 50 MG/1
2 CAPSULE, EXTENDED RELEASE ORAL
Qty: 0 | Refills: 0 | Status: DISCONTINUED | OUTPATIENT
Start: 2018-05-22 | End: 2018-05-23

## 2018-05-22 RX ORDER — MORPHINE SULFATE 50 MG/1
1 CAPSULE, EXTENDED RELEASE ORAL EVERY 6 HOURS
Qty: 0 | Refills: 0 | Status: DISCONTINUED | OUTPATIENT
Start: 2018-05-22 | End: 2018-05-23

## 2018-05-22 RX ORDER — IPRATROPIUM/ALBUTEROL SULFATE 18-103MCG
3 AEROSOL WITH ADAPTER (GRAM) INHALATION
Qty: 0 | Refills: 0 | COMMUNITY
Start: 2018-05-22

## 2018-05-22 RX ORDER — SODIUM CHLORIDE 9 MG/ML
1000 INJECTION, SOLUTION INTRAVENOUS
Qty: 0 | Refills: 0 | COMMUNITY
Start: 2018-05-22

## 2018-05-22 RX ORDER — DILTIAZEM HCL 120 MG
1 CAPSULE, EXT RELEASE 24 HR ORAL
Qty: 0 | Refills: 0 | COMMUNITY

## 2018-05-22 RX ORDER — MORPHINE SULFATE 50 MG/1
0 CAPSULE, EXTENDED RELEASE ORAL
Qty: 0 | Refills: 0 | COMMUNITY
Start: 2018-05-22

## 2018-05-22 RX ADMIN — MORPHINE SULFATE 1 MILLIGRAM(S): 50 CAPSULE, EXTENDED RELEASE ORAL at 18:51

## 2018-05-22 RX ADMIN — Medication 3 MILLILITER(S): at 05:36

## 2018-05-22 RX ADMIN — MORPHINE SULFATE 1 MILLIGRAM(S): 50 CAPSULE, EXTENDED RELEASE ORAL at 23:50

## 2018-05-22 RX ADMIN — Medication 500000 UNIT(S): at 22:36

## 2018-05-22 RX ADMIN — ENOXAPARIN SODIUM 70 MILLIGRAM(S): 100 INJECTION SUBCUTANEOUS at 05:36

## 2018-05-22 RX ADMIN — MORPHINE SULFATE 1 MILLIGRAM(S): 50 CAPSULE, EXTENDED RELEASE ORAL at 05:34

## 2018-05-22 RX ADMIN — MORPHINE SULFATE 1 MILLIGRAM(S): 50 CAPSULE, EXTENDED RELEASE ORAL at 06:34

## 2018-05-22 RX ADMIN — SODIUM CHLORIDE 100 MILLILITER(S): 9 INJECTION, SOLUTION INTRAVENOUS at 11:14

## 2018-05-22 RX ADMIN — MORPHINE SULFATE 1 MILLIGRAM(S): 50 CAPSULE, EXTENDED RELEASE ORAL at 22:35

## 2018-05-22 RX ADMIN — CHLORHEXIDINE GLUCONATE 1 APPLICATION(S): 213 SOLUTION TOPICAL at 11:14

## 2018-05-22 RX ADMIN — Medication 81 MILLIGRAM(S): at 11:14

## 2018-05-22 RX ADMIN — PIPERACILLIN AND TAZOBACTAM 25 GRAM(S): 4; .5 INJECTION, POWDER, LYOPHILIZED, FOR SOLUTION INTRAVENOUS at 05:33

## 2018-05-22 RX ADMIN — Medication 1 APPLICATION(S): at 13:56

## 2018-05-22 RX ADMIN — Medication 3 MILLILITER(S): at 11:05

## 2018-05-22 RX ADMIN — Medication 1 APPLICATION(S): at 05:35

## 2018-05-22 RX ADMIN — Medication 3 MILLILITER(S): at 00:29

## 2018-05-22 RX ADMIN — FLUCONAZOLE 100 MILLIGRAM(S): 150 TABLET ORAL at 22:33

## 2018-05-22 RX ADMIN — PIPERACILLIN AND TAZOBACTAM 25 GRAM(S): 4; .5 INJECTION, POWDER, LYOPHILIZED, FOR SOLUTION INTRAVENOUS at 13:56

## 2018-05-22 NOTE — DISCHARGE NOTE ADULT - PATIENT PORTAL LINK FT
You can access the [facility] Patient Portal, offered by [facility], by registering with the following website: [email or web address]

## 2018-05-22 NOTE — H&P ADULT - PROBLEM SELECTOR PLAN 4
lovenox Discussed risks and benefits of continued anticoagulation and the family wishes to continue the injections at this time for a time limeted trail of 1- 2 days

## 2018-05-22 NOTE — H&P ADULT - NSHPLABSRESULTS_GEN_ALL_CORE
11.4   7.95  )-----------( 205      ( 16 May 2018 12:57 )             36.8   05-16    137  |  104  |  21  ----------------------------<  87  5.3   |  29  |  0.82    Ca    9.0      16 May 2018 12:57    TPro  7.6  /  Alb  2.8<L>  /  TBili  0.5  /  DBili  x   /  AST  62<H>  /  ALT  25  /  AlkPhos  181<H>  05-16      < from: CT Chest No Cont (05.16.18 @ 14:34) >    IMPRESSION: Small left pleural effusion  Extensive bronchiectasis left lower lobe with some superimposed density   which may represent superimposed pneumonia  Significant increase in the size of the aortic arch aneurysm which   measures up to 7 cm. There is resultant volume loss in the left upper   lobe.  Results were discussed with Dr. Ro at 3:30 PM on and a 15 2015    < from: CT Head No Cont (05.16.18 @ 14:34) >    IMPRESSION:    1)  extensive chronic ischemic changes throughout both hemispheres with   atrophy.  2)  no acute abnormality or space occupying lesion.  < from: Xray Chest 1 View AP/PA. (05.16.18 @ 12:45) >    IMPRESSION: Unchanged chest showing large thoracic aneurysm, pacemaker,   and incidental findings as above.

## 2018-05-22 NOTE — H&P ADULT - PROBLEM SELECTOR PROBLEM 2
Thoracic aortic aneurysm without rupture Aspiration pneumonia, unspecified aspiration pneumonia type, unspecified laterality, unspecified part of lung

## 2018-05-22 NOTE — H&P ADULT - PROBLEM SELECTOR PROBLEM 1
Aspiration pneumonia, unspecified aspiration pneumonia type, unspecified laterality, unspecified part of lung Chronic obstructive pulmonary disease, unspecified COPD type

## 2018-05-22 NOTE — H&P ADULT - PROBLEM SELECTOR PLAN 1
[facility] GIP level of care for management of complex medical issues requiring titration of IV medications for symptom management, nebulizers, titration of oxygen therapy  PO route unreliable (see below)  duoneb q 6 hr PRN  DNR/DNI  Morphine 1 mg IV q 4 hr ATC and q 1 hr PRN

## 2018-05-22 NOTE — DISCHARGE NOTE ADULT - HOSPITAL COURSE
89f with extensive thoracic aneurysm and recent inability to talk failed outpatient antibiotics x2 for possible aspiration pneumonia with new deep vein thrombosis     Discharge to inpatient hospice, comfort care      Problem/Plan - 1:  ·  Problem: Aspiration pneumonia, unspecified aspiration pneumonia type, unspecified laterality, unspecified part of lung.  Plan: Continue  Zosyn for coverage of gram negative pna.      Problem/Plan - 2:  ·  Problem: Essential hypertension.  Plan: as pt is npo    while in house will use prn iv bp meds    and started a  low dose catapres patch  Continue  Metoprolol & Clonidine.      Problem/Plan - 3:  ·  Problem: Thoracic aortic aneurysm without rupture.  Plan: patient has several aortic aneurysms (aortic arch 7cm , AAA 4.8 cm, infrarenal 7.9 cm and left iliac artery  3.3cm)        size and serious nature of the aneurysm is poor prognosis   obtained a ct surgery eval pe family requested who agreed not a surgical candidate.    placed a palliative care eval for hospice .   at present npo will use IV BP meds and catapre.      Problem/Plan - 4:  ·  Problem: Generalized abdominal pain.  Plan: etiology multifactorial ct scan as above done without contrast shows multiple aneurysm.  and ? gb disease    comfort care and  inpt hospice and prefer if she can remain in the room she is in .        overall poor prognosis and aware of all the aneurysm,      Problem/Plan - 5:  ·  Problem: Chronic obstructive pulmonary disease, unspecified COPD type.  Plan: duoneb PRN.      Problem/Plan - 6:  Problem: DVT, lower extremity, proximal, acute, left. Plan: Lovenox.     Problem/Plan - 7:  ·  Problem: Laryngeal nerve paralysis.  Plan: -due to aortic arch aneurysm seen by speech and failed bedside eval.    -s/p MBS and still NPO are recommendation.      Problem/Plan - 8:  ·  Problem: Goals of care, counseling/discussion.  Plan: Continue  current  t/t  pt  prognosis  is poor.       45min spent on dc planning

## 2018-05-22 NOTE — H&P ADULT - PROBLEM SELECTOR PLAN 2
family to bring in molst form ntibiotics completed course of BS antibiotics completed  Pt is high risk for repeated aspiration with or without PO intake

## 2018-05-22 NOTE — H&P ADULT - NSHPREVIEWOFSYSTEMS_GEN_ALL_CORE
Obtained mostly from family:  Gen: weak, bedbound, was w/c bound PTA  HEENT: odynophagia, dysphagia, thick white patches on tongue and roof of mouth  CV: dyspnea  Resp: dyspnea, congested cough, difficulty clearing mucous  GI: generalized abd pain. dysphagia  : incontinence  EXT: New DVT LLE, lymphedema Rt arm, midline lt arm  Neuro: alert and oriented t person and place

## 2018-05-22 NOTE — DISCHARGE NOTE ADULT - MEDICATION SUMMARY - MEDICATIONS TO STOP TAKING
I will STOP taking the medications listed below when I get home from the hospital:    Aspirin Enteric Coated 81 mg oral delayed release tablet  -- 2 tab(s) by mouth once a day    traMADol 50 mg oral tablet  -- 1 tab(s) by mouth 2 times a day    levalbuterol 0.63 mg/3 mL inhalation solution  -- 3 milliliter(s) inhaled every 4 hours, As Needed -SOB or wheezing    Caltrate 600 + D oral tablet  -- 1 tab(s) by mouth once a day    losartan 50 mg oral tablet  -- 1 tab(s) by mouth once a day    Fosamax 70 mg oral tablet  -- 1 tab(s) by mouth once a week on Saturday    DilTIAZem Hydrochloride  mg/24 hours oral capsule, extended release  -- 1 cap(s) by mouth once a day    omeprazole 40 mg oral delayed release capsule  -- 1 cap(s) by mouth once a day    Centrum oral tablet  -- 1 tab(s) by mouth once a day    docusate sodium 100 mg oral capsule  -- 1 cap(s) by mouth 2 times a day, As Needed    fosfomycin 3 g oral powder for reconstitution  -- 1 each by mouth once   -- Dilute this medication with liquid before administration.  It is very important that you take or use this exactly as directed.  Do not skip doses or discontinue unless directed by your doctor.

## 2018-05-22 NOTE — PROGRESS NOTE ADULT - PROBLEM SELECTOR PLAN 3
patient has several aortic aneurysms (aortic arch 7cm , AAA 4.8 cm, infrarenal 7.9 cm and left iliac artery  3.3cm)        size and serious nature of the aneurysm is poor prognosis   obtained a ct surgery eval pe family requested who agreed not a surgical candidate.    placed a palliative care eval for hospice .   at present npo will use IV BP meds and catapre

## 2018-05-22 NOTE — DISCHARGE NOTE ADULT - MEDICATION SUMMARY - MEDICATIONS TO TAKE
I will START or STAY ON the medications listed below when I get home from the hospital:    morphine  -- Indication: For Pain    cloNIDine 0.1 mg/24 hr transdermal film, extended release  --  by transdermal patch   -- Indication: For Essential hypertension    enoxaparin  -- Indication: For DVT, lower extremity, proximal, acute, left    chlorhexidine 2% topical pad  -- 1 application on skin once a day  -- Indication: For Preventive    metoprolol tartrate 1 mg/mL injectable solution  --  injectable   -- Indication: For Essential hypertension    ipratropium-albuterol 0.5 mg-2.5 mg/3 mLinhalation solution  -- 3 milliliter(s) inhaled every 6 hours  -- Indication: For Preventive    petrolatum topical ointment  -- 1 application on skin 3 times a day  -- Indication: For Preventive    Dextrose 5% with 0.9% NaCl intravenous solution  -- 1000 milliliter(s) intravenous   -- Indication: For Preventive

## 2018-05-22 NOTE — PROGRESS NOTE ADULT - ASSESSMENT
? LLL Pneumonia / Bronchiectasis   Recommendations    Procalcitonin< 0.05  patient has no fever / WBC   repeat procalcitonin- if still < 0.05 Discontinue zosyn   aspiration precautions
88f with extensive thoracic aneurysm and recent inability to talk failed outpatient antibiotics x2 for possible aspiration pneumonia with new deep vein thrombosis
89f with extensive thoracic aneurysm and recent inability to talk failed outpatient antibiotics x2 for possible aspiration pneumonia with new deep vein thrombosis       Awaiting Hospice eval , on iv morphine for pain control

## 2018-05-22 NOTE — DISCHARGE NOTE ADULT - SECONDARY DIAGNOSIS.
Laryngeal nerve paralysis Generalized abdominal pain Chronic obstructive pulmonary disease, unspecified COPD type Thoracic aortic aneurysm without rupture

## 2018-05-22 NOTE — H&P ADULT - PROBLEM SELECTOR PROBLEM 3
Chronic obstructive pulmonary disease, unspecified COPD type Thoracic aortic aneurysm without rupture

## 2018-05-22 NOTE — H&P ADULT - NSHPPHYSICALEXAM_GEN_ALL_CORE
Vital Signs Last 24 Hrs  T(C): 36.3 (22 May 2018 18:15), Max: 36.9 (22 May 2018 05:24)  T(F): 97.4 (22 May 2018 18:15), Max: 98.4 (22 May 2018 05:24)  HR: 59 (22 May 2018 18:15) (59 - 117)  BP: 130/95 (22 May 2018 18:15) (126/92 - 145/70)  BP(mean): --  RR: 18 (22 May 2018 18:15) (17 - 19)  SpO2: 100% (22 May 2018 18:15) (96% - 100%)    GENERAL: disheveled, chronically ill appearing,   HEENT: white scattered plaques to hard and soft palate, wincing when swallows, EOMI, PERRLA, conjunctiva and sclera clear  ENMT: No tonsillar erythema, exudates, or enlargement; Moist mucous membranes, Good dentition, No lesions  NECK: Supple, No JVD, Normal thyroid  CHEST/LUNG: coarse breath sounds bilaterally, respirations labored with accessory muscle use.   HEART: rate is irregular,  No murmurs, rubs, or gallops  ABDOMEN: Soft, Nontender, Nondistended; Bowel sounds present  EXTREMITIES:  2+ Peripheral Pulses, No clubbing, cyanosis, or edema   LYMPH: RUE lymphadenopathy  SKIN: No rashes or lesions  NEURO: alert, responds to questions with yes or no answers, apparently oriented to person, place, and situation

## 2018-05-22 NOTE — H&P ADULT - PROBLEM SELECTOR PLAN 5
secondary to laryngeal nerve paralysis due to aortic arch anuerysm as well as oropharyngeal candidiasis  high risk for aspiration  Discussed risks and benefits of "pleasure feeding" pt.  Advised family on minimizing risk by making sure pt is alert, interested, sitting up > 45 degrees in bed, thickened liquids off of a spoon.  If excessive coughing or distress would advise against further feeding  dysphagia diet with thickened liquids  Family wishes to continue IVF

## 2018-05-22 NOTE — H&P ADULT - PMH
AAA (Abdominal Aortic Aneurysm)  current diagnosis  AF (Atrial Fibrillation)  diagnosed 2008  pt on sotalol and aspirin  Breast Ca  right s/p right lumpectomy axillary lymph node dissection 2000/ s/p chemo and radiation  current right lymphedema  Cardiac Pacemaker  medtronic placed 2010  Cellulitis  x2 2005  COPD (Chronic Obstructive Pulmonary Disease)    Gout  last attack 15 years ago  Hiatal Hernia  s/p fundoplication 1964  Hypertension    OA (Osteoarthritis)  b/l knee and right hip  s/p fall 8-1010  seen [facility]    Thoracic Aneurysm  pt had scan 8-2011  UTI (Lower Urinary Tract Infection)  chronic pt on antibiotics, no current symptoms

## 2018-05-22 NOTE — PROGRESS NOTE ADULT - SUBJECTIVE AND OBJECTIVE BOX
Patient is a 89y old  Female who presents with a chief complaint of     INTERVAL HPI/OVERNIGHT EVENTS: no events    MEDICATIONS  (STANDING):  ALBUTerol/ipratropium for Nebulization 3 milliLiter(s) Nebulizer every 6 hours  aspirin  chewable 81 milliGRAM(s) Oral daily  chlorhexidine 2% Cloths 1 Application(s) Topical daily  cloNIDine Patch 0.1 mG/24Hr(s) 1 patch Topical every 7 days  dextrose 5% + sodium chloride 0.9%. 1000 milliLiter(s) (100 mL/Hr) IV Continuous <Continuous>  enoxaparin Injectable 70 milliGRAM(s) SubCutaneous every 12 hours  morphine  - Injectable 1 milliGRAM(s) IV Push every 12 hours  petrolatum white Ointment 1 Application(s) Topical three times a day  piperacillin/tazobactam IVPB. 3.375 Gram(s) IV Intermittent every 8 hours    MEDICATIONS  (PRN):  acetaminophen  Suppository. 650 milliGRAM(s) Rectal every 6 hours PRN Mild Pain (1 - 3)  metoprolol tartrate Injectable 2.5 milliGRAM(s) IV Push every 8 hours PRN for sbp >150      Allergies    No Known Drug Allergies  shellfish (Rash)    Intolerances           Vital Signs Last 24 Hrs  T(C): 36.4 (22 May 2018 11:00), Max: 36.9 (22 May 2018 05:24)  T(F): 97.6 (22 May 2018 11:00), Max: 98.4 (22 May 2018 05:24)  HR: 93 (22 May 2018 11:23) (63 - 117)  BP: 145/70 (22 May 2018 11:00) (109/76 - 145/70)  BP(mean): --  RR: 17 (22 May 2018 11:00) (17 - 19)  SpO2: 99% (22 May 2018 11:23) (96% - 99%)    PHYSICAL EXAM:  GENERAL: NAD, well-groomed, well-developed  HEAD:  Atraumatic, Normocephalic  EYES: EOMI, PERRLA, conjunctiva and sclera clear  ENMT: No tonsillar erythema, exudates, or enlargement; Moist mucous membranes, Good dentition, No lesions  NECK: Supple, No JVD, Normal thyroid  CHEST/LUNG: Clear to percussion bilaterally; No rales, rhonchi, wheezing, or rubs  HEART: Regular rate and rhythm; No murmurs, rubs, or gallops  ABDOMEN: Soft, mild tenderness RUQ, Nondistended; Bowel sounds present  EXTREMITIES:  2+ Peripheral Pulses, No clubbing, cyanosis, or edema  LYMPH: No lymphadenopathy noted  SKIN: No rashes or lesions    LABS:              CAPILLARY BLOOD GLUCOSE          RADIOLOGY & ADDITIONAL TESTS:    Imaging Personally Reviewed:  [ ] YES  [ ] NO    Consultant(s) Notes Reviewed:  [ ] YES  [ ] NO    Care Discussed with Consultants/Other Providers [ ] YES  [ ] NO

## 2018-05-22 NOTE — PROGRESS NOTE ADULT - PROVIDER SPECIALTY LIST ADULT
Hospitalist
Palliative Care
Palliative Care
Pulmonology

## 2018-05-22 NOTE — DISCHARGE NOTE ADULT - CARE PLAN
Principal Discharge DX:	Aspiration pneumonia, unspecified aspiration pneumonia type, unspecified laterality, unspecified part of lung  Goal:	completed antibiotics  Assessment and plan of treatment:	d/c to inpatient hospice  Secondary Diagnosis:	Laryngeal nerve paralysis  Secondary Diagnosis:	Generalized abdominal pain  Secondary Diagnosis:	Chronic obstructive pulmonary disease, unspecified COPD type  Secondary Diagnosis:	Thoracic aortic aneurysm without rupture

## 2018-05-22 NOTE — H&P ADULT - HISTORY OF PRESENT ILLNESS
86 y/o F admitted to [facility] with c/o cough x 3-4 weeks, increased dysphagia PTA, increased confusion and forgetfulness as home.  Found to have aspiration PNA. Failed swallow eval and imaging reveals that aortic arch aneurysm is affecting laryngeal nerve.  Pt with persistent dyspnea, chronic throat clearing, odynophagia, abd pain. She is referred to hospice for comfort directed care.  PMH: HTN, COPD, CAD, A-fib, sick sinus syndrome s/p PPM (battery changed 2017), PAD, abdominal and thoracic aortic aneurysms  (s/p EVAR 2017), breast ca s/p RT lumpectomy/LN resection with RUE lymphedema.

## 2018-05-22 NOTE — H&P ADULT - ASSESSMENT
86 y/o F admitted with complicated medical history admitted to [facility] with c/o cough x 3-4 weeks, increased dysphagia PTA, increased confusion and forgetfulness as home.  Found to have aspiration PNA. Failed swallow eval and imaging reveals that aortic arch aneurysm is affecting laryngeal nerve.  Pt with persistent dyspnea, chronic throat clearing, odynophagia, abd pain. She is referred to hospice for comfort directed care.  Discussed goals of care with pt and 2 daughters and partner Joey.  Pt asserts her chief c/o is painful swallowing. Family reports she is coveting water and coffee but has been NPO due to failed fluoroscopic swallow eval.  Discussed aspirayion liklihood and "pleasure feeds" when pt is very motivated and 88 y/o F admitted with complicated medical history admitted to [facility] with c/o cough x 3-4 weeks, increased dysphagia PTA, increased confusion and forgetfulness as home.  Found to have aspiration PNA. Failed swallow eval and imaging reveals that aortic arch aneurysm is affecting laryngeal nerve.  Pt with persistent dyspnea, chronic throat clearing, odynophagia, abd pain. She is referred to hospice for comfort directed care.  Discussed goals of care with pt and 2 daughters and partner Joey. Family asserts that their primary goal of care is for pt to not have pain.  Pt asserts her chief c/o is painful swallowing. Family reports she is coveting water and coffee but has been NPO due to failed fluoroscopic swallow eval.  Discussed aspiration likelihood and "pleasure feeds" when pt is very motivated and wishes to attempt PO intake. As dicsussed below, the pt and family were advised on PO intake and aspiration precautions. the pt continues ot have tenacious mucous from cough and having difficulty clearing throat and PO intake may exacerbate this so we have discussed possible need ot moidify this plan depending on what pt can tolerate.  They are in agreement with the following POC:

## 2018-05-23 VITALS
DIASTOLIC BLOOD PRESSURE: 92 MMHG | RESPIRATION RATE: 17 BRPM | OXYGEN SATURATION: 97 % | HEART RATE: 107 BPM | TEMPERATURE: 98 F | SYSTOLIC BLOOD PRESSURE: 143 MMHG

## 2018-05-23 DIAGNOSIS — I71.2 THORACIC AORTIC ANEURYSM, WITHOUT RUPTURE: ICD-10-CM

## 2018-05-23 DIAGNOSIS — I71.4 ABDOMINAL AORTIC ANEURYSM, WITHOUT RUPTURE: ICD-10-CM

## 2018-05-23 DIAGNOSIS — J44.9 CHRONIC OBSTRUCTIVE PULMONARY DISEASE, UNSPECIFIED: ICD-10-CM

## 2018-05-23 RX ADMIN — MORPHINE SULFATE 1 MILLIGRAM(S): 50 CAPSULE, EXTENDED RELEASE ORAL at 11:48

## 2018-05-23 RX ADMIN — MORPHINE SULFATE 1 MILLIGRAM(S): 50 CAPSULE, EXTENDED RELEASE ORAL at 11:54

## 2018-05-23 RX ADMIN — Medication 500000 UNIT(S): at 05:20

## 2018-05-23 RX ADMIN — Medication 1 PATCH: at 11:49

## 2018-05-23 RX ADMIN — MORPHINE SULFATE 1 MILLIGRAM(S): 50 CAPSULE, EXTENDED RELEASE ORAL at 06:43

## 2018-05-23 RX ADMIN — ENOXAPARIN SODIUM 70 MILLIGRAM(S): 100 INJECTION SUBCUTANEOUS at 05:20

## 2018-05-23 NOTE — PROGRESS NOTE ADULT - SUBJECTIVE AND OBJECTIVE BOX
87 year old female with COPD, CAD, AFib, PPM, history of breast cancer status post lumpectomy and RT,  with increasing dysphagia and confusion.  Treated for aspiration pneumonia.  Now has increased dysjphagia.    temp 97.2  /78  HR98  RR 18    Awake somewhat alert but intermittently confused  Mouth few white plaques  Heart irregular  Lungs grossly clear  PPM in chest  Abdomen soft non tender  Extremities without edema    Continues on lovenox, diflucan, nystatin, IV fluids, clonidine patch, IV morphine around the clock and prn

## 2018-05-25 DIAGNOSIS — K82.9 DISEASE OF GALLBLADDER, UNSPECIFIED: ICD-10-CM

## 2018-05-25 DIAGNOSIS — I71.2 THORACIC AORTIC ANEURYSM, WITHOUT RUPTURE: ICD-10-CM

## 2018-05-25 DIAGNOSIS — J69.0 PNEUMONITIS DUE TO INHALATION OF FOOD AND VOMIT: ICD-10-CM

## 2018-05-25 DIAGNOSIS — I73.9 PERIPHERAL VASCULAR DISEASE, UNSPECIFIED: ICD-10-CM

## 2018-05-25 DIAGNOSIS — Z51.5 ENCOUNTER FOR PALLIATIVE CARE: ICD-10-CM

## 2018-05-25 DIAGNOSIS — M16.0 BILATERAL PRIMARY OSTEOARTHRITIS OF HIP: ICD-10-CM

## 2018-05-25 DIAGNOSIS — Z66 DO NOT RESUSCITATE: ICD-10-CM

## 2018-05-25 DIAGNOSIS — I10 ESSENTIAL (PRIMARY) HYPERTENSION: ICD-10-CM

## 2018-05-25 DIAGNOSIS — Z85.3 PERSONAL HISTORY OF MALIGNANT NEOPLASM OF BREAST: ICD-10-CM

## 2018-05-25 DIAGNOSIS — M17.0 BILATERAL PRIMARY OSTEOARTHRITIS OF KNEE: ICD-10-CM

## 2018-05-25 DIAGNOSIS — I25.10 ATHEROSCLEROTIC HEART DISEASE OF NATIVE CORONARY ARTERY WITHOUT ANGINA PECTORIS: ICD-10-CM

## 2018-05-25 DIAGNOSIS — J44.9 CHRONIC OBSTRUCTIVE PULMONARY DISEASE, UNSPECIFIED: ICD-10-CM

## 2018-05-25 DIAGNOSIS — Z95.0 PRESENCE OF CARDIAC PACEMAKER: ICD-10-CM

## 2018-05-25 DIAGNOSIS — J38.00 PARALYSIS OF VOCAL CORDS AND LARYNX, UNSPECIFIED: ICD-10-CM

## 2018-05-25 DIAGNOSIS — I48.91 UNSPECIFIED ATRIAL FIBRILLATION: ICD-10-CM

## 2018-05-25 DIAGNOSIS — M10.9 GOUT, UNSPECIFIED: ICD-10-CM

## 2018-05-25 DIAGNOSIS — J47.9 BRONCHIECTASIS, UNCOMPLICATED: ICD-10-CM

## 2018-05-25 DIAGNOSIS — I82.4Y2 ACUTE EMBOLISM AND THROMBOSIS OF UNSPECIFIED DEEP VEINS OF LEFT PROXIMAL LOWER EXTREMITY: ICD-10-CM

## 2018-05-28 DIAGNOSIS — R32 UNSPECIFIED URINARY INCONTINENCE: ICD-10-CM

## 2018-05-28 DIAGNOSIS — R13.10 DYSPHAGIA, UNSPECIFIED: ICD-10-CM

## 2018-05-28 DIAGNOSIS — I73.9 PERIPHERAL VASCULAR DISEASE, UNSPECIFIED: ICD-10-CM

## 2018-05-28 DIAGNOSIS — R06.00 DYSPNEA, UNSPECIFIED: ICD-10-CM

## 2018-05-28 DIAGNOSIS — Z92.3 PERSONAL HISTORY OF IRRADIATION: ICD-10-CM

## 2018-05-28 DIAGNOSIS — I25.10 ATHEROSCLEROTIC HEART DISEASE OF NATIVE CORONARY ARTERY WITHOUT ANGINA PECTORIS: ICD-10-CM

## 2018-05-28 DIAGNOSIS — Z79.82 LONG TERM (CURRENT) USE OF ASPIRIN: ICD-10-CM

## 2018-05-28 DIAGNOSIS — I10 ESSENTIAL (PRIMARY) HYPERTENSION: ICD-10-CM

## 2018-05-28 DIAGNOSIS — Z66 DO NOT RESUSCITATE: ICD-10-CM

## 2018-05-28 DIAGNOSIS — J38.00 PARALYSIS OF VOCAL CORDS AND LARYNX, UNSPECIFIED: ICD-10-CM

## 2018-05-28 DIAGNOSIS — J44.9 CHRONIC OBSTRUCTIVE PULMONARY DISEASE, UNSPECIFIED: ICD-10-CM

## 2018-05-28 DIAGNOSIS — Z85.3 PERSONAL HISTORY OF MALIGNANT NEOPLASM OF BREAST: ICD-10-CM

## 2018-05-28 DIAGNOSIS — R41.0 DISORIENTATION, UNSPECIFIED: ICD-10-CM

## 2018-05-28 DIAGNOSIS — I48.91 UNSPECIFIED ATRIAL FIBRILLATION: ICD-10-CM

## 2018-05-28 DIAGNOSIS — I11.9 HYPERTENSIVE HEART DISEASE WITHOUT HEART FAILURE: ICD-10-CM

## 2018-05-28 DIAGNOSIS — I97.2 POSTMASTECTOMY LYMPHEDEMA SYNDROME: ICD-10-CM

## 2018-05-28 DIAGNOSIS — B37.0 CANDIDAL STOMATITIS: ICD-10-CM

## 2018-05-28 DIAGNOSIS — I82.419 ACUTE EMBOLISM AND THROMBOSIS OF UNSPECIFIED FEMORAL VEIN: ICD-10-CM

## 2018-05-28 DIAGNOSIS — I71.4 ABDOMINAL AORTIC ANEURYSM, WITHOUT RUPTURE: ICD-10-CM

## 2018-05-28 DIAGNOSIS — B37.81 CANDIDAL ESOPHAGITIS: ICD-10-CM

## 2018-05-28 DIAGNOSIS — Z51.5 ENCOUNTER FOR PALLIATIVE CARE: ICD-10-CM

## 2018-05-28 DIAGNOSIS — Z90.11 ACQUIRED ABSENCE OF RIGHT BREAST AND NIPPLE: ICD-10-CM

## 2018-05-28 DIAGNOSIS — Z92.21 PERSONAL HISTORY OF ANTINEOPLASTIC CHEMOTHERAPY: ICD-10-CM

## 2018-05-28 DIAGNOSIS — Z95.0 PRESENCE OF CARDIAC PACEMAKER: ICD-10-CM

## 2018-05-28 DIAGNOSIS — Z91.013 ALLERGY TO SEAFOOD: ICD-10-CM

## 2018-05-28 DIAGNOSIS — J69.0 PNEUMONITIS DUE TO INHALATION OF FOOD AND VOMIT: ICD-10-CM

## 2018-05-28 DIAGNOSIS — I71.2 THORACIC AORTIC ANEURYSM, WITHOUT RUPTURE: ICD-10-CM

## 2019-01-09 NOTE — CONSULT NOTE ADULT - SUBJECTIVE AND OBJECTIVE BOX
HPI: 87 f with HTN, COPD, CAD, A-fib, sick sinus syndrome s/p PPM, PAD, abdominal and thoracic aortic aneurysm s/p EVAR, breast ca s/p lumpectomy and lymph node resection with RUE lymphedema, gout was sent from cardio for PPM at EOL. She had dysuria on and off since 2 weeks ago, was given some antibiotics with no improvement so was given another one which she finished, now she stated that does not have any dysuria, suprapubic or back pain, no fever or chills.        PAST MEDICAL & SURGICAL HISTORY:  Cellulitis: x2 2005  s/p fall 8-1010  seen [facility]  UTI (Lower Urinary Tract Infection): chronic pt on antibiotics, no current symptoms  Hiatal Hernia: s/p fundoplication 1964  OA (Osteoarthritis): b/l knee and right hip  Cardiac Pacemaker: medtronic placed 2010  AF (Atrial Fibrillation): diagnosed 2008  pt on sotalol and aspirin  Breast Ca: right s/p right lumpectomy axillary lymph node dissection 2000/ s/p chemo and radiation  current right lymphedema  Thoracic Aneurysm: pt had scan 8-2011  AAA (Abdominal Aortic Aneurysm): current diagnosis  Gout: last attack 15 years ago  COPD (Chronic Obstructive Pulmonary Disease)  Hypertension  H/O dilation and curettage: 5/2015  Femur fracture, right: s/p repair 1/2014  S/P Colonoscopy: 2005 normal  Cardiac Pacemaker:   medtronic  S/P Lumpectomy of Breast: right s/p axillary lymph node dissection/ current lymphadema  Hiatal Hernia: s/p fundoplication 1964  S/P Hernia Repair: right inguinal hernia repair 1956  S/P Appendectomy: 1947  S/P Tonsillectomy: childhood  Thoracic Aneurysm      Allergies    No Known Drug Allergies  shellfish (Rash)    Intolerances        ANTIMICROBIALS:      OTHER MEDS:  allopurinol 300 milliGRAM(s) Oral Once  aspirin enteric coated 81 milliGRAM(s) Oral daily  calcium carbonate 1250 mG + Vitamin D (OsCal 500 + D) 1 Tablet(s) Oral Once  diltiazem    milliGRAM(s) Oral daily  levalbuterol for Nebulization - Peds 0.63 milliGRAM(s) Nebulizer every 4 hours PRN  losartan 50 milliGRAM(s) Oral daily  pantoprazole    Tablet 40 milliGRAM(s) Oral before breakfast  sotalol 120 milliGRAM(s) Oral two times a day  traMADol 50 milliGRAM(s) Oral two times a day PRN      SOCIAL HISTORY:    Marital Status:  (   )    (  ) Single    (   )    ( x )   Occupation: not working  Lives with: children and HHA    Substance Use (street drugs): (x  ) never used  (  ) other:  Tobacco Usage:  ( x ) never smoked   (   ) former smoker   (   ) current smoker  (     ) pack year  (        ) last cigarette date      FAMILY HISTORY:  No pertinent family history in first degree relatives      ROS:  Unobtainable because:   All other systems negative     Constitutional: no fever, no chills, no weight loss, no night sweats  HEENT:  no vision changes, no sore throat, no rhinorrhea  Cardiovascular:  no chest pain, no palpitation  Respiratory:  no SOB, no cough  GI:  no abd pain, no vomiting, no diarrhea  urinary: on and off dysuria, + urinary incontinency, no hematuria, no flank pain  musculoskeletal:  no joint pain, no joint swelling  skin:  no rash  neurology:  no headache, no seizure, no change in mental status    Physical Exam:    General:    NAD, non toxic  HEENT:   no oropharyngeal lesions, no LAD, neck supple  Cardiovascular:    irregular S1,S2, no murmur, left chest AICD with no erythema or drainage  Respiratory:   clear b/l, no wheezing  abd:   soft, BS +, not tender, no hepatosplenomegaly  :     no CVAT, no spurapubic tenderness, no valladares  Musculoskeletal : no joint swelling, no LE edema  Skin:    no rash  Neurologic:     no focal deficits      Drug Dosing Weight  Height (cm): 170.18 (27 Oct 2016 15:45)  Weight (kg): 81.6 (27 Oct 2016 15:45)  BMI (kg/m2): 28.2 (27 Oct 2016 15:45)  BSA (m2): 1.93 (27 Oct 2016 15:45)    Vital Signs Last 24 Hrs  T(F): 98 (12-07-17 @ 14:29), Max: 98 (12-07-17 @ 14:29)    Vital Signs Last 24 Hrs  HR: 65 (12-07-17 @ 14:29) (65 - 67)  BP: 145/92 (12-07-17 @ 14:29) (115/83 - 145/92)  RR: 17 (12-07-17 @ 14:29)  SpO2: 97% (12-07-17 @ 14:29) (97% - 97%)  Wt(kg): --                          11.7   6.6   )-----------( 246      ( 07 Dec 2017 15:43 )             34.9                   MICROBIOLOGY:  v              RADIOLOGY:    < from: US Transvaginal (10.21.17 @ 13:19) >    IMPRESSION:    Thickened endometrium with cystic changes. Consider sampling.
Patient is a 88y old  Female who presents with a chief complaint of SOB (07 Dec 2017 14:14)      HPI:  87 y/o female followed by me for Breast Ca, COPD, OA who was recently found to have a UTI. She has been complaining of urinary frequency, foul odor for the last 1 month. She was given abx empirically w/o relief. She had been unable to give a urine sample until recently and was found to have an Ecoli and Klebsiella which were resistant to everything oral except Cipro. However, as pt was on Sotolol which interacts with cipro and prolongs QT interval, Cardiology evaluation/EKG was performed and she was found to have a pacemaker malfunction and is admitted for PPM revision/battery change.       ROS:  Negative except for: frequent urination    PAST MEDICAL & SURGICAL HISTORY:  Cellulitis: x2 2005  s/p fall 8-1010  seen [facility]  UTI (Lower Urinary Tract Infection): chronic pt on antibiotics, no current symptoms  Hiatal Hernia: s/p fundoplication 1964  OA (Osteoarthritis): b/l knee and right hip  Cardiac Pacemaker: medtronic placed 2010  AF (Atrial Fibrillation): diagnosed 2008  pt on sotalol and aspirin  Breast Ca: right s/p right lumpectomy axillary lymph node dissection 2000/ s/p chemo and radiation  current right lymphedema  Thoracic Aneurysm: pt had scan 8-2011  AAA (Abdominal Aortic Aneurysm): current diagnosis  Gout: last attack 15 years ago  COPD (Chronic Obstructive Pulmonary Disease)  Hypertension  H/O dilation and curettage: 5/2015  Femur fracture, right: s/p repair 1/2014  S/P Colonoscopy: 2005 normal  Cardiac Pacemaker:   medtronic  S/P Lumpectomy of Breast: right s/p axillary lymph node dissection/ current lymphadema  Hiatal Hernia: s/p fundoplication 1964  S/P Hernia Repair: right inguinal hernia repair 1956  S/P Appendectomy: 1947  S/P Tonsillectomy: childhood  Thoracic Aneurysm      SOCIAL HISTORY: unremarkable    FAMILY HISTORY:  No pertinent family history in first degree relatives      MEDICATIONS  (STANDING):  aspirin enteric coated 81 milliGRAM(s) Oral daily  cefTRIAXone   IVPB      cefTRIAXone   IVPB 1 Gram(s) IV Intermittent every 24 hours  diltiazem    milliGRAM(s) Oral daily  losartan 50 milliGRAM(s) Oral daily  pantoprazole    Tablet 40 milliGRAM(s) Oral before breakfast    MEDICATIONS  (PRN):  levalbuterol for Nebulization - Peds 0.63 milliGRAM(s) Nebulizer every 4 hours PRN dyspnea  traMADol 50 milliGRAM(s) Oral two times a day PRN Moderate Pain (4 - 6)      Allergies    No Known Drug Allergies  shellfish (Rash)    Intolerances        Vital Signs Last 24 Hrs  T(C): 36.8 (08 Dec 2017 04:20), Max: 36.8 (08 Dec 2017 04:20)  T(F): 98.2 (08 Dec 2017 04:20), Max: 98.2 (08 Dec 2017 04:20)  HR: 65 (08 Dec 2017 04:20) (65 - 70)  BP: 143/95 (08 Dec 2017 04:20) (115/83 - 150/93)  BP(mean): --  RR: 18 (08 Dec 2017 04:20) (17 - 18)  SpO2: 96% (08 Dec 2017 04:20) (95% - 97%)    REVIEW OF SYSTEMS:    CONSTITUTIONAL: No weakness, fevers or chills  EYES/ENT: No visual changes;  No vertigo or throat pain   NECK: No pain or stiffness  RESPIRATORY: No cough, wheezing, hemoptysis; No shortness of breath  CARDIOVASCULAR: No chest pain or palpitations  GASTROINTESTINAL: No abdominal or epigastric pain. No nausea, vomiting, or hematemesis; No diarrhea or constipation. No melena or hematochezia.  GENITOURINARY: No dysuria, frequency or hematuria  NEUROLOGICAL: No numbness or weakness  SKIN: No itching, burning, rashes, or lesions     PHYSICAL EXAM  General: adult in NAD  HEENT: clear oropharynx, anicteric sclera, pink conjunctiva  Neck: supple  CV: normal S1/S2 with no murmur rubs or gallops  Lungs: positive air movement b/l ant lungs,clear to auscultation, no wheezes, no rales  Abdomen: soft non-tender non-distended, no hepatosplenomegaly  Ext: no clubbing cyanosis or edema  Skin: no rashes and no petechiae  Neuro: alert and oriented X 4, no focal deficits      LABS:                          11.7   6.6   )-----------( 246      ( 07 Dec 2017 15:43 )             34.9         Mean Cell Volume : 105.0 fl  Mean Cell Hemoglobin : 35.1 pg  Mean Cell Hemoglobin Concentration : 33.5 gm/dL  Auto Neutrophil # : 4.4 K/uL  Auto Lymphocyte # : 1.5 K/uL  Auto Monocyte # : 0.6 K/uL  Auto Eosinophil # : 0.0 K/uL  Auto Basophil # : 0.0 K/uL  Auto Neutrophil % : 66.2 %  Auto Lymphocyte % : 22.7 %  Auto Monocyte % : 9.7 %  Auto Eosinophil % : 0.7 %  Auto Basophil % : 0.6 %      12-07    137  |  101  |  23  ----------------------------<  93  4.4   |  25  |  0.79    Ca    8.9      07 Dec 2017 15:43    TPro  7.3  /  Alb  3.5  /  TBili  0.4  /  DBili  x   /  AST  27  /  ALT  14  /  AlkPhos  137<H>  12-07      PT/INR - ( 07 Dec 2017 15:43 )   PT: 14.1 sec;   INR: 1.30 ratio         PTT - ( 07 Dec 2017 15:43 )  PTT:26.9 sec
DISPLAY PLAN FREE TEXT

## 2019-06-14 NOTE — SWALLOW BEDSIDE ASSESSMENT ADULT - SLP PATIENT PROFILE REVIEW
I was present in the ED during this patient's evaluation and management by the Advance Practice Provider and was available to address any concerns about their medical management.     Abdiel Alicea MD  Attending, Emergency Department      Brandon Martin MD  06/13/19 2035 yes

## 2020-08-13 NOTE — PATIENT PROFILE ADULT. - BLOOD TRANSFUSION, PREVIOUS, PROFILE
Writer RN called patient back to let her know that the pharmacy just needs this information of medical history for prior authorization to get insurance to pay for medication.   no

## 2020-12-22 NOTE — DISCHARGE NOTE ADULT - REASON FOR ADMISSION
Taltz Counseling: I discussed with the patient the risks of ixekizumab including but not limited to immunosuppression, serious infections, worsening of inflammatory bowel disease and drug reactions.  The patient understands that monitoring is required including a PPD at baseline and must alert us or the primary physician if symptoms of infection or other concerning signs are noted. pna High Dose Vitamin A Counseling: Side effects reviewed, pt to contact office should one occur. Picato Counseling:  I discussed with the patient the risks of Picato including but not limited to erythema, scaling, itching, weeping, crusting, and pain. Bactrim Counseling:  I discussed with the patient the risks of sulfa antibiotics including but not limited to GI upset, allergic reaction, drug rash, diarrhea, dizziness, photosensitivity, and yeast infections.  Rarely, more serious reactions can occur including but not limited to aplastic anemia, agranulocytosis, methemoglobinemia, blood dyscrasias, liver or kidney failure, lung infiltrates or desquamative/blistering drug rashes. 5-Fu Pregnancy And Lactation Text: This medication is Pregnancy Category X and contraindicated in pregnancy and in women who may become pregnant. It is unknown if this medication is excreted in breast milk. Ivermectin Counseling:  Patient instructed to take medication on an empty stomach with a full glass of water.  Patient informed of potential adverse effects including but not limited to nausea, diarrhea, dizziness, itching, and swelling of the extremities or lymph nodes.  The patient verbalized understanding of the proper use and possible adverse effects of ivermectin.  All of the patient's questions and concerns were addressed. Gabapentin Pregnancy And Lactation Text: This medication is Pregnancy Category C and isn't considered safe during pregnancy. It is excreted in breast milk. Colchicine Counseling:  Patient counseled regarding adverse effects including but not limited to stomach upset (nausea, vomiting, stomach pain, or diarrhea).  Patient instructed to limit alcohol consumption while taking this medication.  Colchicine may reduce blood counts especially with prolonged use.  The patient understands that monitoring of kidney function and blood counts may be required, especially at baseline. The patient verbalized understanding of the proper use and possible adverse effects of colchicine.  All of the patient's questions and concerns were addressed. Isotretinoin Counseling: Patient should get monthly blood tests, not donate blood, not drive at night if vision affected, not share medication, and not undergo elective surgery for 6 months after tx completed. Side effects reviewed, pt to contact office should one occur. Odomzo Pregnancy And Lactation Text: This medication is Pregnancy Category X and is absolutely contraindicated during pregnancy. It is unknown if it is excreted in breast milk. Siliq Counseling:  I discussed with the patient the risks of Siliq including but not limited to new or worsening depression, suicidal thoughts and behavior, immunosuppression, malignancy, posterior leukoencephalopathy syndrome, and serious infections.  The patient understands that monitoring is required including a PPD at baseline and must alert us or the primary physician if symptoms of infection or other concerning signs are noted. There is also a special program designed to monitor depression which is required with Siliq. Cimetidine Pregnancy And Lactation Text: This medication is Pregnancy Category B and is considered safe during pregnancy. It is also excreted in breast milk and breast feeding isn't recommended. Quinolones Counseling:  I discussed with the patient the risks of fluoroquinolones including but not limited to GI upset, allergic reaction, drug rash, diarrhea, dizziness, photosensitivity, yeast infections, liver function test abnormalities, tendonitis/tendon rupture. Minoxidil Counseling: Minoxidil is a topical medication which can increase blood flow where it is applied. It is uncertain how this medication increases hair growth. Side effects are uncommon and include stinging and allergic reactions. Dupixent Pregnancy And Lactation Text: This medication likely crosses the placenta but the risk for the fetus is uncertain. This medication is excreted in breast milk. Bexarotene Counseling:  I discussed with the patient the risks of bexarotene including but not limited to hair loss, dry lips/skin/eyes, liver abnormalities, hyperlipidemia, pancreatitis, depression/suicidal ideation, photosensitivity, drug rash/allergic reactions, hypothyroidism, anemia, leukopenia, infection, cataracts, and teratogenicity.  Patient understands that they will need regular blood tests to check lipid profile, liver function tests, white blood cell count, thyroid function tests and pregnancy test if applicable. Tazorac Counseling:  Patient advised that medication is irritating and drying.  Patient may need to apply sparingly and wash off after an hour before eventually leaving it on overnight.  The patient verbalized understanding of the proper use and possible adverse effects of tazorac.  All of the patient's questions and concerns were addressed. Fluconazole Counseling:  Patient counseled regarding adverse effects of fluconazole including but not limited to headache, diarrhea, nausea, upset stomach, liver function test abnormalities, taste disturbance, and stomach pain.  There is a rare possibility of liver failure that can occur when taking fluconazole.  The patient understands that monitoring of LFTs and kidney function test may be required, especially at baseline. The patient verbalized understanding of the proper use and possible adverse effects of fluconazole.  All of the patient's questions and concerns were addressed. Bexarotene Pregnancy And Lactation Text: This medication is Pregnancy Category X and should not be given to women who are pregnant or may become pregnant. This medication should not be used if you are breast feeding. Tetracycline Pregnancy And Lactation Text: This medication is Pregnancy Category D and not consider safe during pregnancy. It is also excreted in breast milk. High Dose Vitamin A Pregnancy And Lactation Text: High dose vitamin A therapy is contraindicated during pregnancy and breast feeding. Use Enhanced Medication Counseling?: No Clindamycin Pregnancy And Lactation Text: This medication can be used in pregnancy if certain situations. Clindamycin is also present in breast milk. Zyclara Pregnancy And Lactation Text: This medication is Pregnancy Category C. It is unknown if this medication is excreted in breast milk. Solaraze Pregnancy And Lactation Text: This medication is Pregnancy Category B and is considered safe. There is some data to suggest avoiding during the third trimester. It is unknown if this medication is excreted in breast milk. Taltz Pregnancy And Lactation Text: The risk during pregnancy and breastfeeding is uncertain with this medication. Fluconazole Pregnancy And Lactation Text: This medication is Pregnancy Category C and it isn't know if it is safe during pregnancy. It is also excreted in breast milk. Bactrim Pregnancy And Lactation Text: This medication is Pregnancy Category D and is known to cause fetal risk.  It is also excreted in breast milk. Protopic Counseling: Patient may experience a mild burning sensation during topical application. Protopic is not approved in children less than 2 years of age. There have been case reports of hematologic and skin malignancies in patients using topical calcineurin inhibitors although causality is questionable. Dapsone Pregnancy And Lactation Text: This medication is Pregnancy Category C and is not considered safe during pregnancy or breast feeding. Erythromycin Pregnancy And Lactation Text: This medication is Pregnancy Category B and is considered safe during pregnancy. It is also excreted in breast milk. Cellcept Pregnancy And Lactation Text: This medication is Pregnancy Category D and isn't considered safe during pregnancy. It is unknown if this medication is excreted in breast milk. Valtrex Pregnancy And Lactation Text: this medication is Pregnancy Category B and is considered safe during pregnancy. This medication is not directly found in breast milk but it's metabolite acyclovir is present. Skyrizi Counseling: I discussed with the patient the risks of risankizumab-rzaa including but not limited to immunosuppression, and serious infections.  The patient understands that monitoring is required including a PPD at baseline and must alert us or the primary physician if symptoms of infection or other concerning signs are noted. Rituxan Counseling:  I discussed with the patient the risks of Rituxan infusions. Side effects can include infusion reactions, severe drug rashes including mucocutaneous reactions, reactivation of latent hepatitis and other infections and rarely progressive multifocal leukoencephalopathy.  All of the patient's questions and concerns were addressed. Hydroxyzine Counseling: Patient advised that the medication is sedating and not to drive a car after taking this medication.  Patient informed of potential adverse effects including but not limited to dry mouth, urinary retention, and blurry vision.  The patient verbalized understanding of the proper use and possible adverse effects of hydroxyzine.  All of the patient's questions and concerns were addressed. Hydroquinone Counseling:  Patient advised that medication may result in skin irritation, lightening (hypopigmentation), dryness, and burning.  In the event of skin irritation, the patient was advised to reduce the amount of the drug applied or use it less frequently.  Rarely, spots that are treated with hydroquinone can become darker (pseudoochronosis).  Should this occur, patient instructed to stop medication and call the office. The patient verbalized understanding of the proper use and possible adverse effects of hydroquinone.  All of the patient's questions and concerns were addressed. Cyclosporine Counseling:  I discussed with the patient the risks of cyclosporine including but not limited to hypertension, gingival hyperplasia,myelosuppression, immunosuppression, liver damage, kidney damage, neurotoxicity, lymphoma, and serious infections. The patient understands that monitoring is required including baseline blood pressure, CBC, CMP, lipid panel and uric acid, and then 1-2 times monthly CMP and blood pressure. Tetracycline Counseling: Patient counseled regarding possible photosensitivity and increased risk for sunburn.  Patient instructed to avoid sunlight, if possible.  When exposed to sunlight, patients should wear protective clothing, sunglasses, and sunscreen.  The patient was instructed to call the office immediately if the following severe adverse effects occur:  hearing changes, easy bruising/bleeding, severe headache, or vision changes.  The patient verbalized understanding of the proper use and possible adverse effects of tetracycline.  All of the patient's questions and concerns were addressed. Patient understands to avoid pregnancy while on therapy due to potential birth defects. Eucrisa Counseling: Patient may experience a mild burning sensation during topical application. Eucrisa is not approved in children less than 2 years of age. Doxycycline Counseling:  Patient counseled regarding possible photosensitivity and increased risk for sunburn.  Patient instructed to avoid sunlight, if possible.  When exposed to sunlight, patients should wear protective clothing, sunglasses, and sunscreen.  The patient was instructed to call the office immediately if the following severe adverse effects occur:  hearing changes, easy bruising/bleeding, severe headache, or vision changes.  The patient verbalized understanding of the proper use and possible adverse effects of doxycycline.  All of the patient's questions and concerns were addressed. Cephalexin Counseling: I counseled the patient regarding use of cephalexin as an antibiotic for prophylactic and/or therapeutic purposes. Cephalexin (commonly prescribed under brand name Keflex) is a cephalosporin antibiotic which is active against numerous classes of bacteria, including most skin bacteria. Side effects may include nausea, diarrhea, gastrointestinal upset, rash, hives, yeast infections, and in rare cases, hepatitis, kidney disease, seizures, fever, confusion, neurologic symptoms, and others. Patients with severe allergies to penicillin medications are cautioned that there is about a 10% incidence of cross-reactivity with cephalosporins. When possible, patients with penicillin allergies should use alternatives to cephalosporins for antibiotic therapy. Topical Sulfur Applications Pregnancy And Lactation Text: This medication is Pregnancy Category C and has an unknown safety profile during pregnancy. It is unknown if this topical medication is excreted in breast milk. Minoxidil Pregnancy And Lactation Text: This medication has not been assigned a Pregnancy Risk Category but animal studies failed to show danger with the topical medication. It is unknown if the medication is excreted in breast milk. Prednisone Pregnancy And Lactation Text: This medication is Pregnancy Category C and it isn't know if it is safe during pregnancy. This medication is excreted in breast milk. Cephalexin Pregnancy And Lactation Text: This medication is Pregnancy Category B and considered safe during pregnancy.  It is also excreted in breast milk but can be used safely for shorter doses. Xeljanz Counseling: I discussed with the patient the risks of Xeljanz therapy including increased risk of infection, liver issues, headache, diarrhea, or cold symptoms. Live vaccines should be avoided. They were instructed to call if they have any problems. Metronidazole Counseling:  I discussed with the patient the risks of metronidazole including but not limited to seizures, nausea/vomiting, a metallic taste in the mouth, nausea/vomiting and severe allergy. Cyclophosphamide Counseling:  I discussed with the patient the risks of cyclophosphamide including but not limited to hair loss, hormonal abnormalities, decreased fertility, abdominal pain, diarrhea, nausea and vomiting, bone marrow suppression and infection. The patient understands that monitoring is required while taking this medication. Benzoyl Peroxide Pregnancy And Lactation Text: This medication is Pregnancy Category C. It is unknown if benzoyl peroxide is excreted in breast milk. Topical Retinoid counseling:  Patient advised to apply a pea-sized amount only at bedtime and wait 30 minutes after washing their face before applying.  If too drying, patient may add a non-comedogenic moisturizer. The patient verbalized understanding of the proper use and possible adverse effects of retinoids.  All of the patient's questions and concerns were addressed. Albendazole Counseling:  I discussed with the patient the risks of albendazole including but not limited to cytopenia, kidney damage, nausea/vomiting and severe allergy.  The patient understands that this medication is being used in an off-label manner. Enbrel Counseling:  I discussed with the patient the risks of etanercept including but not limited to myelosuppression, immunosuppression, autoimmune hepatitis, demyelinating diseases, lymphoma, and infections.  The patient understands that monitoring is required including a PPD at baseline and must alert us or the primary physician if symptoms of infection or other concerning signs are noted. Topical Clindamycin Counseling: Patient counseled that this medication may cause skin irritation or allergic reactions.  In the event of skin irritation, the patient was advised to reduce the amount of the drug applied or use it less frequently.   The patient verbalized understanding of the proper use and possible adverse effects of clindamycin.  All of the patient's questions and concerns were addressed. Glycopyrrolate Counseling:  I discussed with the patient the risks of glycopyrrolate including but not limited to skin rash, drowsiness, dry mouth, difficulty urinating, and blurred vision. Rifampin Pregnancy And Lactation Text: This medication is Pregnancy Category C and it isn't know if it is safe during pregnancy. It is also excreted in breast milk and should not be used if you are breast feeding. Xolair Counseling:  Patient informed of potential adverse effects including but not limited to fever, muscle aches, rash and allergic reactions.  The patient verbalized understanding of the proper use and possible adverse effects of Xolair.  All of the patient's questions and concerns were addressed. Birth Control Pills Pregnancy And Lactation Text: This medication should be avoided if pregnant and for the first 30 days post-partum. Stelara Counseling:  I discussed with the patient the risks of ustekinumab including but not limited to immunosuppression, malignancy, posterior leukoencephalopathy syndrome, and serious infections.  The patient understands that monitoring is required including a PPD at baseline and must alert us or the primary physician if symptoms of infection or other concerning signs are noted. Oxybutynin Pregnancy And Lactation Text: This medication is Pregnancy Category B and is considered safe during pregnancy. It is unknown if it is excreted in breast milk. Rifampin Counseling: I discussed with the patient the risks of rifampin including but not limited to liver damage, kidney damage, red-orange body fluids, nausea/vomiting and severe allergy. Metronidazole Pregnancy And Lactation Text: This medication is Pregnancy Category B and considered safe during pregnancy.  It is also excreted in breast milk. Erythromycin Counseling:  I discussed with the patient the risks of erythromycin including but not limited to GI upset, allergic reaction, drug rash, diarrhea, increase in liver enzymes, and yeast infections. Birth Control Pills Counseling: Birth Control Pill Counseling: I discussed with the patient the potential side effects of OCPs including but not limited to increased risk of stroke, heart attack, thrombophlebitis, deep venous thrombosis, hepatic adenomas, breast changes, GI upset, headaches, and depression.  The patient verbalized understanding of the proper use and possible adverse effects of OCPs. All of the patient's questions and concerns were addressed. Protopic Pregnancy And Lactation Text: This medication is Pregnancy Category C. It is unknown if this medication is excreted in breast milk when applied topically. Otezla Counseling: The side effects of Otezla were discussed with the patient, including but not limited to worsening or new depression, weight loss, diarrhea, nausea, upper respiratory tract infection, and headache. Patient instructed to call the office should any adverse effect occur.  The patient verbalized understanding of the proper use and possible adverse effects of Otezla.  All the patient's questions and concerns were addressed. Itraconazole Counseling:  I discussed with the patient the risks of itraconazole including but not limited to liver damage, nausea/vomiting, neuropathy, and severe allergy.  The patient understands that this medication is best absorbed when taken with acidic beverages such as non-diet cola or ginger ale.  The patient understands that monitoring is required including baseline LFTs and repeat LFTs at intervals.  The patient understands that they are to contact us or the primary physician if concerning signs are noted. Minocycline Counseling: Patient advised regarding possible photosensitivity and discoloration of the teeth, skin, lips, tongue and gums.  Patient instructed to avoid sunlight, if possible.  When exposed to sunlight, patients should wear protective clothing, sunglasses, and sunscreen.  The patient was instructed to call the office immediately if the following severe adverse effects occur:  hearing changes, easy bruising/bleeding, severe headache, or vision changes.  The patient verbalized understanding of the proper use and possible adverse effects of minocycline.  All of the patient's questions and concerns were addressed. Doxepin Counseling:  Patient advised that the medication is sedating and not to drive a car after taking this medication. Patient informed of potential adverse effects including but not limited to dry mouth, urinary retention, and blurry vision.  The patient verbalized understanding of the proper use and possible adverse effects of doxepin.  All of the patient's questions and concerns were addressed. Azithromycin Pregnancy And Lactation Text: This medication is considered safe during pregnancy and is also secreted in breast milk. Acitretin Pregnancy And Lactation Text: This medication is Pregnancy Category X and should not be given to women who are pregnant or may become pregnant in the future. This medication is excreted in breast milk. Prednisone Counseling:  I discussed with the patient the risks of prolonged use of prednisone including but not limited to weight gain, insomnia, osteoporosis, mood changes, diabetes, susceptibility to infection, glaucoma and high blood pressure.  In cases where prednisone use is prolonged, patients should be monitored with blood pressure checks, serum glucose levels and an eye exam.  Additionally, the patient may need to be placed on GI prophylaxis, PCP prophylaxis, and calcium and vitamin D supplementation and/or a bisphosphonate.  The patient verbalized understanding of the proper use and the possible adverse effects of prednisone.  All of the patient's questions and concerns were addressed. Xolair Pregnancy And Lactation Text: This medication is Pregnancy Category B and is considered safe during pregnancy. This medication is excreted in breast milk. Sarecycline Counseling: Patient advised regarding possible photosensitivity and discoloration of the teeth, skin, lips, tongue and gums.  Patient instructed to avoid sunlight, if possible.  When exposed to sunlight, patients should wear protective clothing, sunglasses, and sunscreen.  The patient was instructed to call the office immediately if the following severe adverse effects occur:  hearing changes, easy bruising/bleeding, severe headache, or vision changes.  The patient verbalized understanding of the proper use and possible adverse effects of sarecycline.  All of the patient's questions and concerns were addressed. Isotretinoin Pregnancy And Lactation Text: This medication is Pregnancy Category X and is considered extremely dangerous during pregnancy. It is unknown if it is excreted in breast milk. Spironolactone Counseling: Patient advised regarding risks of diarrhea, abdominal pain, hyperkalemia, birth defects (for female patients), liver toxicity and renal toxicity. The patient may need blood work to monitor liver and kidney function and potassium levels while on therapy. The patient verbalized understanding of the proper use and possible adverse effects of spironolactone.  All of the patient's questions and concerns were addressed. Terbinafine Counseling: Patient counseling regarding adverse effects of terbinafine including but not limited to headache, diarrhea, rash, upset stomach, liver function test abnormalities, itching, taste/smell disturbance, nausea, abdominal pain, and flatulence.  There is a rare possibility of liver failure that can occur when taking terbinafine.  The patient understands that a baseline LFT and kidney function test may be required. The patient verbalized understanding of the proper use and possible adverse effects of terbinafine.  All of the patient's questions and concerns were addressed. Ilumya Counseling: I discussed with the patient the risks of tildrakizumab including but not limited to immunosuppression, malignancy, posterior leukoencephalopathy syndrome, and serious infections.  The patient understands that monitoring is required including a PPD at baseline and must alert us or the primary physician if symptoms of infection or other concerning signs are noted. Infliximab Counseling:  I discussed with the patient the risks of infliximab including but not limited to myelosuppression, immunosuppression, autoimmune hepatitis, demyelinating diseases, lymphoma, and serious infections.  The patient understands that monitoring is required including a PPD at baseline and must alert us or the primary physician if symptoms of infection or other concerning signs are noted. Doxycycline Pregnancy And Lactation Text: This medication is Pregnancy Category D and not consider safe during pregnancy. It is also excreted in breast milk but is considered safe for shorter treatment courses. Erivedge Counseling- I discussed with the patient the risks of Erivedge including but not limited to nausea, vomiting, diarrhea, constipation, weight loss, changes in the sense of taste, decreased appetite, muscle spasms, and hair loss.  The patient verbalized understanding of the proper use and possible adverse effects of Erivedge.  All of the patient's questions and concerns were addressed. Odomzo Counseling- I discussed with the patient the risks of Odomzo including but not limited to nausea, vomiting, diarrhea, constipation, weight loss, changes in the sense of taste, decreased appetite, muscle spasms, and hair loss.  The patient verbalized understanding of the proper use and possible adverse effects of Odomzo.  All of the patient's questions and concerns were addressed. Benzoyl Peroxide Counseling: Patient counseled that medicine may cause skin irritation and bleach clothing.  In the event of skin irritation, the patient was advised to reduce the amount of the drug applied or use it less frequently.   The patient verbalized understanding of the proper use and possible adverse effects of benzoyl peroxide.  All of the patient's questions and concerns were addressed. Tremfya Counseling: I discussed with the patient the risks of guselkumab including but not limited to immunosuppression, serious infections, worsening of inflammatory bowel disease and drug reactions.  The patient understands that monitoring is required including a PPD at baseline and must alert us or the primary physician if symptoms of infection or other concerning signs are noted. Hydroxyzine Pregnancy And Lactation Text: This medication is not safe during pregnancy and should not be taken. It is also excreted in breast milk and breast feeding isn't recommended. Valtrex Counseling: I discussed with the patient the risks of valacyclovir including but not limited to kidney damage, nausea, vomiting and severe allergy.  The patient understands that if the infection seems to be worsening or is not improving, they are to call. Rituxan Pregnancy And Lactation Text: This medication is Pregnancy Category C and it isn't know if it is safe during pregnancy. It is unknown if this medication is excreted in breast milk but similar antibodies are known to be excreted. SSKI Counseling:  I discussed with the patient the risks of SSKI including but not limited to thyroid abnormalities, metallic taste, GI upset, fever, headache, acne, arthralgias, paraesthesias, lymphadenopathy, easy bleeding, arrhythmias, and allergic reaction. Humira Pregnancy And Lactation Text: This medication is Pregnancy Category B and is considered safe during pregnancy. It is unknown if this medication is excreted in breast milk. 5-Fu Counseling: 5-Fluorouracil Counseling:  I discussed with the patient the risks of 5-fluorouracil including but not limited to erythema, scaling, itching, weeping, crusting, and pain. Elidel Counseling: Patient may experience a mild burning sensation during topical application. Elidel is not approved in children less than 2 years of age. There have been case reports of hematologic and skin malignancies in patients using topical calcineurin inhibitors although causality is questionable. Simponi Counseling:  I discussed with the patient the risks of golimumab including but not limited to myelosuppression, immunosuppression, autoimmune hepatitis, demyelinating diseases, lymphoma, and serious infections.  The patient understands that monitoring is required including a PPD at baseline and must alert us or the primary physician if symptoms of infection or other concerning signs are noted. Clindamycin Counseling: I counseled the patient regarding use of clindamycin as an antibiotic for prophylactic and/or therapeutic purposes. Clindamycin is active against numerous classes of bacteria, including skin bacteria. Side effects may include nausea, diarrhea, gastrointestinal upset, rash, hives, yeast infections, and in rare cases, colitis. Detail Level: Detailed Nsaids Counseling: NSAID Counseling: I discussed with the patient that NSAIDs should be taken with food. Prolonged use of NSAIDs can result in the development of stomach ulcers.  Patient advised to stop taking NSAIDs if abdominal pain occurs.  The patient verbalized understanding of the proper use and possible adverse effects of NSAIDs.  All of the patient's questions and concerns were addressed. Cellcept Counseling:  I discussed with the patient the risks of mycophenolate mofetil including but not limited to infection/immunosuppression, GI upset, hypokalemia, hypercholesterolemia, bone marrow suppression, lymphoproliferative disorders, malignancy, GI ulceration/bleed/perforation, colitis, interstitial lung disease, kidney failure, progressive multifocal leukoencephalopathy, and birth defects.  The patient understands that monitoring is required including a baseline creatinine and regular CBC testing. In addition, patient must alert us immediately if symptoms of infection or other concerning signs are noted. Arava Counseling:  Patient counseled regarding adverse effects of Arava including but not limited to nausea, vomiting, abnormalities in liver function tests. Patients may develop mouth sores, rash, diarrhea, and abnormalities in blood counts. The patient understands that monitoring is required including LFTs and blood counts.  There is a rare possibility of scarring of the liver and lung problems that can occur when taking methotrexate. Persistent nausea, loss of appetite, pale stools, dark urine, cough, and shortness of breath should be reported immediately. Patient advised to discontinue Arava treatment and consult with a physician prior to attempting conception. The patient will have to undergo a treatment to eliminate Arava from the body prior to conception. Doxepin Pregnancy And Lactation Text: This medication is Pregnancy Category C and it isn't known if it is safe during pregnancy. It is also excreted in breast milk and breast feeding isn't recommended. Solaraze Counseling:  I discussed with the patient the risks of Solaraze including but not limited to erythema, scaling, itching, weeping, crusting, and pain. Hydroxychloroquine Pregnancy And Lactation Text: This medication has been shown to cause fetal harm but it isn't assigned a Pregnancy Risk Category. There are small amounts excreted in breast milk. Hydroxychloroquine Counseling:  I discussed with the patient that a baseline ophthalmologic exam is needed at the start of therapy and every year thereafter while on therapy. A CBC may also be warranted for monitoring.  The side effects of this medication were discussed with the patient, including but not limited to agranulocytosis, aplastic anemia, seizures, rashes, retinopathy, and liver toxicity. Patient instructed to call the office should any adverse effect occur.  The patient verbalized understanding of the proper use and possible adverse effects of Plaquenil.  All the patient's questions and concerns were addressed. Sski Pregnancy And Lactation Text: This medication is Pregnancy Category D and isn't considered safe during pregnancy. It is excreted in breast milk. Imiquimod Counseling:  I discussed with the patient the risks of imiquimod including but not limited to erythema, scaling, itching, weeping, crusting, and pain.  Patient understands that the inflammatory response to imiquimod is variable from person to person and was educated regarded proper titration schedule.  If flu-like symptoms develop, patient knows to discontinue the medication and contact us. Carac Counseling:  I discussed with the patient the risks of Carac including but not limited to erythema, scaling, itching, weeping, crusting, and pain. Cimzia Counseling:  I discussed with the patient the risks of Cimzia including but not limited to immunosuppression, allergic reactions and infections.  The patient understands that monitoring is required including a PPD at baseline and must alert us or the primary physician if symptoms of infection or other concerning signs are noted. Cosentyx Counseling:  I discussed with the patient the risks of Cosentyx including but not limited to worsening of Crohn's disease, immunosuppression, allergic reactions and infections.  The patient understands that monitoring is required including a PPD at baseline and must alert us or the primary physician if symptoms of infection or other concerning signs are noted. Humira Counseling:  I discussed with the patient the risks of adalimumab including but not limited to myelosuppression, immunosuppression, autoimmune hepatitis, demyelinating diseases, lymphoma, and serious infections.  The patient understands that monitoring is required including a PPD at baseline and must alert us or the primary physician if symptoms of infection or other concerning signs are noted. Otezla Pregnancy And Lactation Text: This medication is Pregnancy Category C and it isn't known if it is safe during pregnancy. It is unknown if it is excreted in breast milk. Griseofulvin Pregnancy And Lactation Text: This medication is Pregnancy Category X and is known to cause serious birth defects. It is unknown if this medication is excreted in breast milk but breast feeding should be avoided. Azithromycin Counseling:  I discussed with the patient the risks of azithromycin including but not limited to GI upset, allergic reaction, drug rash, diarrhea, and yeast infections. Glycopyrrolate Pregnancy And Lactation Text: This medication is Pregnancy Category B and is considered safe during pregnancy. It is unknown if it is excreted breast milk. Nsaids Pregnancy And Lactation Text: These medications are considered safe up to 30 weeks gestation. It is excreted in breast milk. Cimetidine Counseling:  I discussed with the patient the risks of Cimetidine including but not limited to gynecomastia, headache, diarrhea, nausea, drowsiness, arrhythmias, pancreatitis, skin rashes, psychosis, bone marrow suppression and kidney toxicity. Thalidomide Counseling: I discussed with the patient the risks of thalidomide including but not limited to birth defects, anxiety, weakness, chest pain, dizziness, cough and severe allergy. Albendazole Pregnancy And Lactation Text: This medication is Pregnancy Category C and it isn't known if it is safe during pregnancy. It is also excreted in breast milk. Drysol Pregnancy And Lactation Text: This medication is considered safe during pregnancy and breast feeding. Dapsone Counseling: I discussed with the patient the risks of dapsone including but not limited to hemolytic anemia, agranulocytosis, rashes, methemoglobinemia, kidney failure, peripheral neuropathy, headaches, GI upset, and liver toxicity.  Patients who start dapsone require monitoring including baseline LFTs and weekly CBCs for the first month, then every month thereafter.  The patient verbalized understanding of the proper use and possible adverse effects of dapsone.  All of the patient's questions and concerns were addressed. Ketoconazole Pregnancy And Lactation Text: This medication is Pregnancy Category C and it isn't know if it is safe during pregnancy. It is also excreted in breast milk and breast feeding isn't recommended. Tazorac Pregnancy And Lactation Text: This medication is not safe during pregnancy. It is unknown if this medication is excreted in breast milk. Griseofulvin Counseling:  I discussed with the patient the risks of griseofulvin including but not limited to photosensitivity, cytopenia, liver damage, nausea/vomiting and severe allergy.  The patient understands that this medication is best absorbed when taken with a fatty meal (e.g., ice cream or french fries). Drysol Counseling:  I discussed with the patient the risks of drysol/aluminum chloride including but not limited to skin rash, itching, irritation, burning. Acitretin Counseling:  I discussed with the patient the risks of acitretin including but not limited to hair loss, dry lips/skin/eyes, liver damage, hyperlipidemia, depression/suicidal ideation, photosensitivity.  Serious rare side effects can include but are not limited to pancreatitis, pseudotumor cerebri, bony changes, clot formation/stroke/heart attack.  Patient understands that alcohol is contraindicated since it can result in liver toxicity and significantly prolong the elimination of the drug by many years. Oxybutynin Counseling:  I discussed with the patient the risks of oxybutynin including but not limited to skin rash, drowsiness, dry mouth, difficulty urinating, and blurred vision. Cimzia Pregnancy And Lactation Text: This medication crosses the placenta but can be considered safe in certain situations. Cimzia may be excreted in breast milk. Dupixent Counseling: I discussed with the patient the risks of dupilumab including but not limited to eye infection and irritation, cold sores, injection site reactions, worsening of asthma, allergic reactions and increased risk of parasitic infection.  Live vaccines should be avoided while taking dupilumab. Dupilumab will also interact with certain medications such as warfarin and cyclosporine. The patient understands that monitoring is required and they must alert us or the primary physician if symptoms of infection or other concerning signs are noted. Azathioprine Counseling:  I discussed with the patient the risks of azathioprine including but not limited to myelosuppression, immunosuppression, hepatotoxicity, lymphoma, and infections.  The patient understands that monitoring is required including baseline LFTs, Creatinine, possible TPMP genotyping and weekly CBCs for the first month and then every 2 weeks thereafter.  The patient verbalized understanding of the proper use and possible adverse effects of azathioprine.  All of the patient's questions and concerns were addressed. Topical Sulfur Applications Counseling: Topical Sulfur Counseling: Patient counseled that this medication may cause skin irritation or allergic reactions.  In the event of skin irritation, the patient was advised to reduce the amount of the drug applied or use it less frequently.   The patient verbalized understanding of the proper use and possible adverse effects of topical sulfur application.  All of the patient's questions and concerns were addressed. Gabapentin Counseling: I discussed with the patient the risks of gabapentin including but not limited to dizziness, somnolence, fatigue and ataxia. Methotrexate Counseling:  Patient counseled regarding adverse effects of methotrexate including but not limited to nausea, vomiting, abnormalities in liver function tests. Patients may develop mouth sores, rash, diarrhea, and abnormalities in blood counts. The patient understands that monitoring is required including LFT's and blood counts.  There is a rare possibility of scarring of the liver and lung problems that can occur when taking methotrexate. Persistent nausea, loss of appetite, pale stools, dark urine, cough, and shortness of breath should be reported immediately. Patient advised to discontinue methotrexate treatment at least three months before attempting to become pregnant.  I discussed the need for folate supplements while taking methotrexate.  These supplements can decrease side effects during methotrexate treatment. The patient verbalized understanding of the proper use and possible adverse effects of methotrexate.  All of the patient's questions and concerns were addressed. Methotrexate Pregnancy And Lactation Text: This medication is Pregnancy Category X and is known to cause fetal harm. This medication is excreted in breast milk. Clofazimine Counseling:  I discussed with the patient the risks of clofazimine including but not limited to skin and eye pigmentation, liver damage, nausea/vomiting, gastrointestinal bleeding and allergy. Xelcrisz Pregnancy And Lactation Text: This medication is Pregnancy Category D and is not considered safe during pregnancy.  The risk during breast feeding is also uncertain. Zyclara Counseling:  I discussed with the patient the risks of imiquimod including but not limited to erythema, scaling, itching, weeping, crusting, and pain.  Patient understands that the inflammatory response to imiquimod is variable from person to person and was educated regarded proper titration schedule.  If flu-like symptoms develop, patient knows to discontinue the medication and contact us. Ketoconazole Counseling:   Patient counseled regarding improving absorption with orange juice.  Adverse effects include but are not limited to breast enlargement, headache, diarrhea, nausea, upset stomach, liver function test abnormalities, taste disturbance, and stomach pain.  There is a rare possibility of liver failure that can occur when taking ketoconazole. The patient understands that monitoring of LFTs may be required, especially at baseline. The patient verbalized understanding of the proper use and possible adverse effects of ketoconazole.  All of the patient's questions and concerns were addressed. Cyclophosphamide Pregnancy And Lactation Text: This medication is Pregnancy Category D and it isn't considered safe during pregnancy. This medication is excreted in breast milk. Spironolactone Pregnancy And Lactation Text: This medication can cause feminization of the male fetus and should be avoided during pregnancy. The active metabolite is also found in breast milk.

## 2021-05-09 NOTE — PROGRESS NOTE ADULT - PROBLEM/PLAN-8
Patient expressed that she would like to go home soon and that she was unsure of the need for home health. This RN explained the role and benefits of home health. Patient is accepting of a meeting with case management and doctors for further discussion.   DISPLAY PLAN FREE TEXT

## 2022-03-10 NOTE — ED ADULT NURSE NOTE - NSSISCREENINGQ2_ED_A_ED
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 She is well known to this OT after recently seeing her for after R MCA stroke. She was discharged to rehab [facility] and spent a few weeks at SNF, but pt reports very unsatisfied with her care and limited therapy and reports since [facility] home her  \"is doing everything for her\". She reports to her PCP appointment this week she required X2 assist to pivot to her w/c. She was evaluated today by OT. Her L sided weakness, impaired L visual deficits, impaired standing balance, impaired mobility are limiting with all ADLs. Pt requires min/mod AX2 to complete transfers this date to chair. In chair she required total A for LBD. Min A for UBD and SBA for set up for grooming with use of R hand. She reports her goal is to get better and care for herself again. She has a supportive spouse at [facility]. I feel she would benefit from acute rehab at [facility]. Continued OT to address LUE function, ADL training, balance, transfers.    OT wore a mask, eye protection, gloves, and hand hygiene. Pt wore a mask.   " No

## 2022-11-22 NOTE — ED PROVIDER NOTE - MEDICAL DECISION MAKING DETAILS
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 NPO for now. d/w family, aware of prognosis and was told by PMD, pulm and thoracic surgeon pt is not a candidate for intervention. will treat pna with abx, solumedrol for copd. d/w dr. jane for admission. DISPLAY PLAN FREE TEXT

## 2022-12-12 NOTE — ED ADULT NURSE NOTE - ATTEMPT TO OOB
[FreeTextEntry1] : 85yo female with cc of complex renal cyst. Pt was evaluated for abnormal liver enzymes. She had US that made incidental note of a 4.6cm complex cyst in the midpole of the right kidney. Per report, just states internal debris (no mention of septations or thickening). 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 She was sent for CT scan and this showed 5.2x4.3cm cyst with hyperdense nodule along posterior wall measuring 1cm and additional nodules 1.2cm in mid pole region of R kidney. Pt does get periodic imaging for AAA and had never been told about abnormal renal findings in the past. \par \par Former smoker, quit in 1975, 50 pack years. No family hx of  malignancy. Mom with hx of colon ca and breast ca. Dad with pancreatic ca. Sister with breast and colon. Brother with prostate ca. Sister with liver ca (one of 9 children and she only living child). \par \par MRI Aug 2021 showed less suspicious findings with 5cm cyst with non enhancing septation and some hemorrhagic cysts. No concerning findings. Reviewed most recent MRI with pt today that shows multiple R renal cysts with layering proteinaceous debris. No spetations seen. 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 \par \par 
no

## 2023-09-13 NOTE — PROGRESS NOTE ADULT - PROBLEM SELECTOR PLAN 3
09/12/23 1430   Living Environment   People in Home alone   Current Living Arrangements home   Primary Care Provided by self   Provides Primary Care For no one   Family Caregiver if Needed child(dave), adult;other relative(s)   Family Caregiver Names Son Damion Knox and daughter-in-law Ashely Knox   Quality of Family Relationships helpful;involved;supportive   Able to Return to Prior Arrangements other (see comments)   Living Arrangement Comments SS spoke to pt, son Damion Knox and daughter-in-law Ashely Knox.  Pt is a  and lives alone in [place], [place].  Pt has one son Damion Knox who lives in [place] and works 3rd shift in [employer].  Son called pt daily prior to CVA.  Pt is a retired teacher.  Pt receives [facility] Medicare and teacher's [employer] pension.  PCP is Dr. Octavio Aiken.  Pt receives mail-order prescriptions and [facility].  Pt does not have living will or POA.  Pt plans to appoint son as POA.  Pt used a quad cane some in her home but mostly used this device when she went somewhere, was independent with ADL's, laundry, bill paying, meals, and driving prior to CVA.   Resource/Environmental Concerns   Resource/Environmental Concerns none   Transportation Concerns none   Transition Planning   Patient/Family Anticipates Transition to home with family   Patient/Family Anticipated Services at Transition   (To be recommended closer to discharge date.)   Transportation Anticipated family or friend will provide   Discharge Needs Assessment (IRF)   Current Outpatient/Agency/Support Group   (Pt did not receive home health or outpatient therapy prior to CVA.)   Concerns to be Addressed adjustment to diagnosis/illness   Concerns Comments recent CVA   Equipment Currently Used at Home cane, quad;oxygen;bp cuff;glucometer  (Pt has home O2 at 2L [place] that she wore most of the time prior to admission.  Pt has a built-in seat in her shower.)   Current Discharge Risk  physical impairment;lives alone   Discharge Coordination/Progress Pt is agreeable to go home with son Damion and daughter-in-law Ashely Knox if needed at discharge.  Son and daughter-in-law work.  Son may take FMLA from work to assist with pt's caregiving needs.  Educated about option of short-term SNF placement and reviewed SNF benefits.  Son does not want pt to go to [facility] but if SNF placement for short-term rehab is recommended this could be considered.  Team conference will be held on 9-12-23.  SS will follow and assist with discharge planning.        rio

## 2023-09-18 NOTE — PROGRESS NOTE ADULT - PROBLEM SELECTOR PLAN 1
Body Location Override (Optional - Billing Will Still Be Based On Selected Body Map Location If Applicable): left nasal sidewall Detail Level: Detailed Add 41543 Cpt? (Important Note: In 2017 The Use Of 45462 Is Being Tracked By Cms To Determine Future Global Period Reimbursement For Global Periods): yes afebrile no elevated wbc   procal normal   zosyn   blood cx rvp urine cx all are negative

## 2023-09-21 NOTE — PATIENT PROFILE ADULT. - MENTAL HEALTH CONDITIONS/SYMPTOMS, PROFILE
none [employer] Pregnancy And Lactation Text: This medication is Pregnancy Category X and contraindicated in pregnancy and in women who may become pregnant. It is unknown if this medication is excreted in breast milk.

## 2023-12-18 NOTE — PATIENT PROFILE ADULT. - BLOOD AVOIDANCE/RESTRICTIONS, PROFILE
rivaroxaban ANTICOAGULANT (XARELTO) 20 MG TABS tablet 90 tablet 3 1/6/2023  Platelet Count   Date Value Ref Range Status   09/06/2022 249 150 - 450 10e3/uL Final   10/06/2020 230 150 - 450 10e9/L Final     Serum creatinine: 0.81 mg/dL 10/09/23 1601  Estimated creatinine clearance: 48.6 mL/min       lisinopril (ZESTRIL) 10 MG tablet 90 tablet 3 12/12/2022  BP Readings from Last 3 Encounters:   10/09/23 (!) 155/71   07/10/23 131/65   01/09/23 (!) 145/68     Last Office Visit : 10/9/2023  [facility] [place]     Nickolas Davenport MD     Future Office visit:  0    Routing refill request to provider for review/approval because:  Xarelto: PLT recheck overdue, CrCl <50. Rx PENDED.  Lisinopril: BP out of range, review per protocol. 90 day refill given.    
none

## 2024-01-02 NOTE — PATIENT PROFILE ADULT. - PURPOSEFUL PROACTIVE ROUNDING
HCC coding opportunities          Chart Reviewed number of suggestions sent to Provider: 1     Patients Insurance   E66.01  Medicare Insurance: Medicare           Patient

## 2024-04-29 NOTE — ED PROVIDER NOTE - NS ED MD EM SELECTION
Prep Survey      Flowsheet Row Responses   [facility] patient discharged from? [place]   Is LACE score < 7 ? No   Eligibility [facility]   Date of Admission 04/24/24   Date of Discharge 04/29/24   Discharge Disposition Home or Self Care   Discharge diagnosis Sepsis   Does the patient have one of the following disease processes/diagnoses(primary or secondary)? Sepsis   Does the patient have Home health ordered? No   Is there a DME ordered? No   Comments regarding appointments Follow up with Dr. Derian Barry on 5/6   Medication alerts for this patient see AVS   Prep survey completed? Yes            Aimee MEYERS - Registered Nurse           [id number] Comprehensive

## 2024-07-14 NOTE — H&P ADULT - PROBLEM SELECTOR PROBLEM 4
DVT, lower extremity, proximal, acute, left - Pt w/ hx of ESRD on HD, T/Th/Sat. Last HD 7/13 on Sat  - Continue home Cincalcet, Calcium tabs  - Nephrology consult in AM for HD